# Patient Record
Sex: FEMALE | Race: BLACK OR AFRICAN AMERICAN | Employment: UNEMPLOYED | URBAN - METROPOLITAN AREA
[De-identification: names, ages, dates, MRNs, and addresses within clinical notes are randomized per-mention and may not be internally consistent; named-entity substitution may affect disease eponyms.]

---

## 2021-08-09 ENCOUNTER — APPOINTMENT (OUTPATIENT)
Dept: CT IMAGING | Age: 36
End: 2021-08-09
Payer: COMMERCIAL

## 2021-08-09 ENCOUNTER — HOSPITAL ENCOUNTER (EMERGENCY)
Age: 36
Discharge: HOME | End: 2021-08-09
Attending: EMERGENCY MEDICINE
Payer: COMMERCIAL

## 2021-08-09 ENCOUNTER — APPOINTMENT (OUTPATIENT)
Dept: GENERAL RADIOLOGY | Age: 36
End: 2021-08-09
Payer: COMMERCIAL

## 2021-08-09 VITALS
OXYGEN SATURATION: 99 % | HEART RATE: 58 BPM | DIASTOLIC BLOOD PRESSURE: 98 MMHG | TEMPERATURE: 97.3 F | HEIGHT: 68 IN | SYSTOLIC BLOOD PRESSURE: 158 MMHG | RESPIRATION RATE: 16 BRPM

## 2021-08-09 DIAGNOSIS — I10 HYPERTENSION, UNSPECIFIED TYPE: Primary | ICD-10-CM

## 2021-08-09 DIAGNOSIS — R06.00 DYSPNEA, UNSPECIFIED TYPE: ICD-10-CM

## 2021-08-09 DIAGNOSIS — R31.9 HEMATURIA, UNSPECIFIED TYPE: ICD-10-CM

## 2021-08-09 DIAGNOSIS — R20.2 TINGLING OF LEFT UPPER EXTREMITY: ICD-10-CM

## 2021-08-09 LAB
ALBUMIN SERPL-MCNC: 4 G/DL (ref 3.5–5.2)
ALP BLD-CCNC: 107 U/L (ref 35–104)
ALT SERPL-CCNC: 35 U/L (ref 0–32)
ANION GAP SERPL CALCULATED.3IONS-SCNC: 5 MMOL/L (ref 7–16)
AST SERPL-CCNC: 25 U/L (ref 0–31)
BACTERIA: ABNORMAL /HPF
BASOPHILS ABSOLUTE: 0 E9/L (ref 0–0.2)
BASOPHILS RELATIVE PERCENT: 0 % (ref 0–2)
BILIRUB SERPL-MCNC: 0.4 MG/DL (ref 0–1.2)
BILIRUBIN URINE: NEGATIVE
BLOOD, URINE: ABNORMAL
BUN BLDV-MCNC: 13 MG/DL (ref 6–20)
CALCIUM SERPL-MCNC: 9.4 MG/DL (ref 8.6–10.2)
CHLORIDE BLD-SCNC: 103 MMOL/L (ref 98–107)
CLARITY: CLEAR
CO2: 28 MMOL/L (ref 22–29)
COLOR: YELLOW
CREAT SERPL-MCNC: 0.6 MG/DL (ref 0.5–1)
D DIMER: 348 NG/ML DDU
EKG ATRIAL RATE: 56 BPM
EKG P AXIS: 42 DEGREES
EKG P-R INTERVAL: 150 MS
EKG Q-T INTERVAL: 450 MS
EKG QRS DURATION: 90 MS
EKG QTC CALCULATION (BAZETT): 434 MS
EKG R AXIS: 15 DEGREES
EKG T AXIS: 32 DEGREES
EKG VENTRICULAR RATE: 56 BPM
EOSINOPHILS ABSOLUTE: 0.42 E9/L (ref 0.05–0.5)
EOSINOPHILS RELATIVE PERCENT: 7.8 % (ref 0–6)
GFR AFRICAN AMERICAN: >60
GFR NON-AFRICAN AMERICAN: >60 ML/MIN/1.73
GLUCOSE BLD-MCNC: 104 MG/DL (ref 74–99)
GLUCOSE URINE: NEGATIVE MG/DL
HCG, URINE, POC: NEGATIVE
HCT VFR BLD CALC: 37.2 % (ref 34–48)
HEMOGLOBIN: 11.7 G/DL (ref 11.5–15.5)
KETONES, URINE: NEGATIVE MG/DL
LEUKOCYTE ESTERASE, URINE: NEGATIVE
LYMPHOCYTES ABSOLUTE: 2.32 E9/L (ref 1.5–4)
LYMPHOCYTES RELATIVE PERCENT: 42.6 % (ref 20–42)
Lab: NORMAL
MCH RBC QN AUTO: 19.9 PG (ref 26–35)
MCHC RBC AUTO-ENTMCNC: 31.5 % (ref 32–34.5)
MCV RBC AUTO: 63.4 FL (ref 80–99.9)
MONOCYTES ABSOLUTE: 0.22 E9/L (ref 0.1–0.95)
MONOCYTES RELATIVE PERCENT: 3.5 % (ref 2–12)
NEGATIVE QC PASS/FAIL: NORMAL
NEUTROPHILS ABSOLUTE: 2.48 E9/L (ref 1.8–7.3)
NEUTROPHILS RELATIVE PERCENT: 46.1 % (ref 43–80)
NITRITE, URINE: NEGATIVE
NUCLEATED RED BLOOD CELLS: 0 /100 WBC
PDW BLD-RTO: 15.1 FL (ref 11.5–15)
PH UA: 6 (ref 5–9)
PLATELET # BLD: 304 E9/L (ref 130–450)
PMV BLD AUTO: 9.9 FL (ref 7–12)
POIKILOCYTES: ABNORMAL
POLYCHROMASIA: ABNORMAL
POSITIVE QC PASS/FAIL: NORMAL
POTASSIUM REFLEX MAGNESIUM: 4.2 MMOL/L (ref 3.5–5)
PROTEIN UA: NEGATIVE MG/DL
RBC # BLD: 5.87 E12/L (ref 3.5–5.5)
RBC UA: ABNORMAL /HPF (ref 0–2)
SODIUM BLD-SCNC: 136 MMOL/L (ref 132–146)
SPECIFIC GRAVITY UA: 1.01 (ref 1–1.03)
TARGET CELLS: ABNORMAL
TOTAL PROTEIN: 7.4 G/DL (ref 6.4–8.3)
TROPONIN, HIGH SENSITIVITY: <6 NG/L (ref 0–9)
UROBILINOGEN, URINE: 0.2 E.U./DL
WBC # BLD: 5.4 E9/L (ref 4.5–11.5)
WBC UA: ABNORMAL /HPF (ref 0–5)

## 2021-08-09 PROCEDURE — 71046 X-RAY EXAM CHEST 2 VIEWS: CPT

## 2021-08-09 PROCEDURE — 80053 COMPREHEN METABOLIC PANEL: CPT

## 2021-08-09 PROCEDURE — 2580000003 HC RX 258: Performed by: RADIOLOGY

## 2021-08-09 PROCEDURE — 99284 EMERGENCY DEPT VISIT MOD MDM: CPT

## 2021-08-09 PROCEDURE — 93005 ELECTROCARDIOGRAM TRACING: CPT | Performed by: PHYSICIAN ASSISTANT

## 2021-08-09 PROCEDURE — 85025 COMPLETE CBC W/AUTO DIFF WBC: CPT

## 2021-08-09 PROCEDURE — 84484 ASSAY OF TROPONIN QUANT: CPT

## 2021-08-09 PROCEDURE — 6360000004 HC RX CONTRAST MEDICATION: Performed by: RADIOLOGY

## 2021-08-09 PROCEDURE — 70450 CT HEAD/BRAIN W/O DYE: CPT

## 2021-08-09 PROCEDURE — 81001 URINALYSIS AUTO W/SCOPE: CPT

## 2021-08-09 PROCEDURE — 71275 CT ANGIOGRAPHY CHEST: CPT

## 2021-08-09 PROCEDURE — 85378 FIBRIN DEGRADE SEMIQUANT: CPT

## 2021-08-09 PROCEDURE — 2580000003 HC RX 258: Performed by: PHYSICIAN ASSISTANT

## 2021-08-09 PROCEDURE — 72125 CT NECK SPINE W/O DYE: CPT

## 2021-08-09 PROCEDURE — 93010 ELECTROCARDIOGRAM REPORT: CPT | Performed by: INTERNAL MEDICINE

## 2021-08-09 RX ORDER — 0.9 % SODIUM CHLORIDE 0.9 %
1000 INTRAVENOUS SOLUTION INTRAVENOUS ONCE
Status: COMPLETED | OUTPATIENT
Start: 2021-08-09 | End: 2021-08-09

## 2021-08-09 RX ORDER — SODIUM CHLORIDE 0.9 % (FLUSH) 0.9 %
10 SYRINGE (ML) INJECTION PRN
Status: DISCONTINUED | OUTPATIENT
Start: 2021-08-09 | End: 2021-08-09 | Stop reason: HOSPADM

## 2021-08-09 RX ADMIN — SODIUM CHLORIDE 1000 ML: 9 INJECTION, SOLUTION INTRAVENOUS at 13:22

## 2021-08-09 RX ADMIN — IOPAMIDOL 75 ML: 755 INJECTION, SOLUTION INTRAVENOUS at 13:21

## 2021-08-09 RX ADMIN — Medication 10 ML: at 13:22

## 2021-08-09 NOTE — ED PROVIDER NOTES
ED Attending  [facility]  Department of Emergency Medicine   ED  Encounter Note  Admit Date/RoomTime: 2021 11:14 AM  ED Room: 32/    NAME: Jacinta Infante  : 1985  MRN: [id number]     Chief Complaint:  Hypertension (states is supposed to be taking medication but has been out for months, reports 178 SBP today) and Tingling (left arm since Saturday)    HISTORY OF PRESENT ILLNESS        Jacinta Infante is a 38 y.o. female who presents to the ED by private vehicle for high blood pressure, fatigue, and L arm tingling, beginning several days ago. Patient states that today she was at an appointment when they took her blood pressure and told her that it was in the 986S systolic. She states that she is supposed to be on blood pressure medication but has not taken it for several months as she ran out. She states that she was diagnosed with high blood pressure months ago and is unsure what the medication was that she is supposed to be taking. Patient notes for the past 2 weeks she has had intermittent shortness of breath whenever she goes up steps or walks for a long period of time. She does note that she is a smoker. Patient also states that she has left arm tingling for several days now. She states that it is to her entire arm. She denies any trauma to this arm. Patient notes a history of cocaine use but states she has not used for the past 8 months. She states that she does not have a family doctor. Patient does not believe that she has high cholesterol or is a diabetic. She denies a personal history of heart problems or family history of heart problems. Patient denies recent travel, surgery, calf pain or swelling, history of blood clots, history of CA, estrogen supplementation, or blood thinner use.  Pt denies fever, chills, HA, vision changes, speech changes, sore throat, neck pain, ear pain, dizziness, lightheadedness, chest pain, pain with inspiration, hemoptysis, cough, nausea, vomiting, diarrhea, constipation, abdominal pain, back pain, urinary symptoms, vaginal bleeding, calf pain/swelling, or extremity pain/injury. She denies any other significant PMH. PERC Rule for PE for Age <50:      Age ? 50 negative     HR ? 100 negative     O2 Sat on Room Air < 95% negative     Prior History of DVT/PE negative     Recent Trauma or Surgery negative     Hemoptysis negative     Exogenous Estrogen/Hormone Use negative     Unilateral Extgremity Swelling negative     * If ANY Criteria are positive, the PERC rule is not satisfied and cannot be used to rule out PE in this patient. HISTORY: 0  EC  AGE: 1  RISK FACTORS: 1  TROPONIN: 0    TOTAL SCORE: 1    ROS   Pertinent positives and negatives are stated within HPI, all other systems reviewed and are negative. Past Medical History:  has a past medical history of Hypertension. Surgical History:  has no past surgical history on file. Social History:      Family History: family history is not on file. Allergies: Patient has no known allergies. PHYSICAL EXAM   Oxygen Saturation Interpretation: Normal on room air analysis. ED Triage Vitals [21 1112]   BP Temp Temp Source Pulse Resp SpO2 Height Weight   (!) 139/99 97.3 °F (36.3 °C) Temporal 58 16 99 % 5' 7.5\" (1.715 m) --       Vitals:    21 1112 21 1403   BP: (!) 139/99 (!) 158/98   Pulse: 58    Resp: 16    Temp: 97.3 °F (36.3 °C)    TempSrc: Temporal    SpO2: 99%    Height: 5' 7.5\" (1.715 m)      Physical Exam  Constitutional/General: Alert and oriented x3, well appearing, non toxic. HEENT:  NC/NT. PERRLA, EOMI bilaterally; Airway patent. Neck: Supple, full ROM, non tender to palpation in the midline, no stridor, no crepitus, no meningeal signs  Respiratory: Lungs clear to auscultation bilaterally, no wheezes, rales, stridor, or rhonchi. Not in respiratory distress  CV:  Regular rate. Regular rhythm. No murmurs, gallops, or rubs.  2+ distal Glucose 104 (H) 74 - 99 mg/dL    BUN 13 6 - 20 mg/dL    CREATININE 0.6 0.5 - 1.0 mg/dL    GFR Non-African American >60 >=60 mL/min/1.73    GFR African American >60     Calcium 9.4 8.6 - 10.2 mg/dL    Total Protein 7.4 6.4 - 8.3 g/dL    Albumin 4.0 3.5 - 5.2 g/dL    Total Bilirubin 0.4 0.0 - 1.2 mg/dL    Alkaline Phosphatase 107 (H) 35 - 104 U/L    ALT 35 (H) 0 - 32 U/L    AST 25 0 - 31 U/L   Troponin   Result Value Ref Range    Troponin, High Sensitivity <6 0 - 9 ng/L   Urinalysis with Microscopic   Result Value Ref Range    Color, UA Yellow Straw/Yellow    Clarity, UA Clear Clear    Glucose, Ur Negative Negative mg/dL    Bilirubin Urine Negative Negative    Ketones, Urine Negative Negative mg/dL    Specific Gravity, UA 1.015 1.005 - 1.030    Blood, Urine LARGE (A) Negative    pH, UA 6.0 5.0 - 9.0    Protein, UA Negative Negative mg/dL    Urobilinogen, Urine 0.2 <2.0 E.U./dL    Nitrite, Urine Negative Negative    Leukocyte Esterase, Urine Negative Negative    WBC, UA NONE 0 - 5 /HPF    RBC, UA 5-10 (A) 0 - 2 /HPF    Bacteria, UA NONE SEEN None Seen /HPF   D-Dimer, Quantitative   Result Value Ref Range    D-Dimer, Quant 348 ng/mL DDU   POC Pregnancy Urine Qual   Result Value Ref Range    HCG, Urine, POC Negative Negative    Lot Number [id number]     Positive QC Pass/Fail Pass     Negative QC Pass/Fail Pass    EKG 12 Lead   Result Value Ref Range    Ventricular Rate 56 BPM    Atrial Rate 56 BPM    P-R Interval 150 ms    QRS Duration 90 ms    Q-T Interval 450 ms    QTc Calculation (Bazett) 434 ms    P Axis 42 degrees    R Axis 15 degrees    T Axis 32 degrees     Imaging: All Radiology results interpreted by Radiologist unless otherwise noted. CTA PULMONARY W CONTRAST   Final Result   No evidence of pulmonary embolism or acute pulmonary abnormality. XR CHEST (2 VW)   Final Result   No acute process. CT HEAD WO CONTRAST   Final Result   No acute intracranial abnormality.   Specifically, there is no acute the diagnosis and prognosis. Questions are answered at this time and are agreeable with the plan. ASSESSMENT     1. Hypertension, unspecified type    2. Hematuria, unspecified type    3. Tingling of left upper extremity    4. Dyspnea, unspecified type      This patient's ED course included: a personal history and physicial examination, re-evaluation prior to disposition and multiple bedside re-evaluations  This patient has remained hemodynamically stable and improved during their ED course. PLAN   Discharged home. Patient condition is good. New Medications     There are no discharge medications for this patient. Electronically signed by Sae Hyde PA-C   DD: 8/9/21  **This report was transcribed using voice recognition software. Every effort was made to ensure accuracy; however, inadvertent computerized transcription errors may be present.   END OF PROVIDER NOTE        Lazaro Henriquez PA-C  08/09/21 5579

## 2022-02-10 ENCOUNTER — HOSPITAL ENCOUNTER (EMERGENCY)
Age: 37
Discharge: HOME | End: 2022-02-10
Payer: COMMERCIAL

## 2022-02-10 ENCOUNTER — APPOINTMENT (OUTPATIENT)
Dept: GENERAL RADIOLOGY | Age: 37
End: 2022-02-10
Payer: COMMERCIAL

## 2022-02-10 VITALS
OXYGEN SATURATION: 100 % | RESPIRATION RATE: 16 BRPM | HEART RATE: 62 BPM | DIASTOLIC BLOOD PRESSURE: 69 MMHG | SYSTOLIC BLOOD PRESSURE: 117 MMHG | TEMPERATURE: 98.2 F

## 2022-02-10 DIAGNOSIS — S82.831A CLOSED FRACTURE OF DISTAL END OF RIGHT FIBULA, UNSPECIFIED FRACTURE MORPHOLOGY, INITIAL ENCOUNTER: Primary | ICD-10-CM

## 2022-02-10 DIAGNOSIS — S92.101A CLOSED NONDISPLACED FRACTURE OF RIGHT TALUS, UNSPECIFIED PORTION OF TALUS, INITIAL ENCOUNTER: ICD-10-CM
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
## 2022-02-10 PROCEDURE — 99285 EMERGENCY DEPT VISIT HI MDM: CPT

## 2022-02-10 PROCEDURE — 6360000002 HC RX W HCPCS: Performed by: FAMILY MEDICINE

## 2022-02-10 PROCEDURE — 29515 APPLICATION SHORT LEG SPLINT: CPT

## 2022-02-10 PROCEDURE — 6360000002 HC RX W HCPCS: Performed by: PHYSICIAN ASSISTANT

## 2022-02-10 PROCEDURE — 73610 X-RAY EXAM OF ANKLE: CPT

## 2022-02-10 PROCEDURE — 96372 THER/PROPH/DIAG INJ SC/IM: CPT

## 2022-02-10 PROCEDURE — 73630 X-RAY EXAM OF FOOT: CPT

## 2022-02-10 RX ORDER — OXYCODONE HYDROCHLORIDE AND ACETAMINOPHEN 5; 325 MG/1; MG/1
1 TABLET ORAL EVERY 8 HOURS PRN
Qty: 12 TABLET | Refills: 0 | Status: SHIPPED | OUTPATIENT
Start: 2022-02-10 | End: 2022-02-14

## 2022-02-10 RX ORDER — FENTANYL CITRATE 50 UG/ML
50 INJECTION, SOLUTION INTRAMUSCULAR; INTRAVENOUS ONCE
Status: COMPLETED | OUTPATIENT
Start: 2022-02-10 | End: 2022-02-10

## 2022-02-10 RX ORDER — KETOROLAC TROMETHAMINE 30 MG/ML
15 INJECTION, SOLUTION INTRAMUSCULAR; INTRAVENOUS ONCE
Status: COMPLETED | OUTPATIENT
Start: 2022-02-10 | End: 2022-02-10

## 2022-02-10 RX ORDER — IBUPROFEN 800 MG/1
800 TABLET ORAL 2 TIMES DAILY PRN
Qty: 20 TABLET | Refills: 0 | Status: SHIPPED | OUTPATIENT
Start: 2022-02-10 | End: 2022-02-21

## 2022-02-10 RX ORDER — FENTANYL CITRATE 50 UG/ML
100 INJECTION, SOLUTION INTRAMUSCULAR; INTRAVENOUS ONCE
Status: DISCONTINUED | OUTPATIENT
Start: 2022-02-10 | End: 2022-02-10

## 2022-02-10 RX ADMIN — FENTANYL CITRATE 50 MCG: 0.05 INJECTION, SOLUTION INTRAMUSCULAR; INTRAVENOUS at 12:30

## 2022-02-10 RX ADMIN — KETOROLAC TROMETHAMINE 15 MG: 30 INJECTION, SOLUTION INTRAMUSCULAR at 11:32

## 2022-02-10 ASSESSMENT — PAIN SCALES - GENERAL
PAINLEVEL_OUTOF10: 3
PAINLEVEL_OUTOF10: 10
PAINLEVEL_OUTOF10: 3

## 2022-02-10 ASSESSMENT — PAIN DESCRIPTION - PAIN TYPE
TYPE: ACUTE PAIN
TYPE: ACUTE PAIN

## 2022-02-10 ASSESSMENT — PAIN DESCRIPTION - PROGRESSION: CLINICAL_PROGRESSION: RAPIDLY IMPROVING

## 2022-02-10 ASSESSMENT — PAIN DESCRIPTION - ORIENTATION: ORIENTATION: RIGHT

## 2022-02-10 ASSESSMENT — PAIN DESCRIPTION - LOCATION
LOCATION: ANKLE
LOCATION: FOOT

## 2022-02-10 ASSESSMENT — PAIN - FUNCTIONAL ASSESSMENT: PAIN_FUNCTIONAL_ASSESSMENT: 0-10

## 2022-02-10 ASSESSMENT — PAIN DESCRIPTION - FREQUENCY: FREQUENCY: CONTINUOUS

## 2022-02-10 NOTE — ED NOTES
Discussed discharge plans with patient including importance of adhering to the instructions the Ortho MD discussed with her and the importance of following medication regiment and making follow up appnt with the outpatient Ortho. Pt instructed and coached on proper use and mechanics of utilizing crutches and was able to demonstrate proper use of the crutches at bedside. Pt father also at bedside to ask questions and ensure he understood discharge process.   Pt remained alert and oriented x4 and pain was minimal.      Catarino Ford RN  02/10/22 2022

## 2022-02-10 NOTE — DISCHARGE INSTR - COC
Continuity of Care Form    Patient Name: Ines Rueda   :  1985  MRN:  [id number]    Admit date:  2/10/2022  Discharge date:  ***    Code Status Order: No Order   Advance Directives:      Admitting Physician:  No admitting provider for patient encounter. PCP: No primary care provider on file. Discharging Nurse: [facility] Unit/Room#: ST04/ST-04  Discharging Unit Phone Number: ***    Emergency Contact:   Extended Emergency Contact Information  Primary Emergency Contact: Todd Rivas  Mobile Phone: [phone]  Relation: Parent   needed? No    Past Surgical History:  History reviewed. No pertinent surgical history. Immunization History:   Immunization History   Administered Date(s) Administered    COVID-19, J&J, PF, 0.5 mL 2021       Active Problems: There is no problem list on file for this patient. Isolation/Infection:   Isolation            No Isolation          Patient Infection Status       None to display            Nurse Assessment:  Last Vital Signs: There were no vitals taken for this visit. Last documented pain score (0-10 scale): Pain Level: 10  Last Weight:   Wt Readings from Last 1 Encounters:   No data found for Wt     Mental Status:  {IP PT MENTAL STATUS:40153}    IV Access:  { LIO IV ACCESS:527374067}    Nursing Mobility/ADLs:  Walking   {CHP DME DAVC:829482732}  Transfer  {CHP DME WMRS:517365775}  Bathing  {CHP DME SFWF:500526139}  Dressing  {CHP DME VRXT:547174055}  Toileting  {P DME GJSK:741458027}  Feeding  {P DME IOWD:149492270}  Med Admin  {P DME THNU:423711005}  Med Delivery   { LIO MED Delivery:513717336}    Wound Care Documentation and Therapy:        Elimination:  Continence:    Bowel: {YES / QP:91734}  Bladder: {YES / LS:26122}  Urinary Catheter: {Urinary Catheter:939731648}   Colostomy/Ileostomy/Ileal Conduit: {YES / CS:91264}       Date of Last BM: ***  No intake or output data in the 24 hours ending 02/10/22 1134  No intake/output data recorded.     Safety Concerns:     [address] Mario LIO Safety Concerns:945143366}    Impairments/Disabilities:      [address] [facility] Impairments/Disabilities:291615061}    Nutrition Therapy:  Current Nutrition Therapy:   Dave Mario [facility] Diet List:759536306}    Routes of Feeding: {CHP DME Other Feedings:592151583}  Liquids: {Slp liquid thickness:94591}  Daily Fluid Restriction: {CHP DME Yes amt example:542449690}  Last Modified Barium Swallow with Video (Video Swallowing Test): {Done Not Done YNCR:678362652}    Treatments at the Time of Hospital Discharge:   Respiratory Treatments: ***  Oxygen Therapy:  {Therapy; copd oxygen:49363}  Ventilator:    { CC Vent DNCN:350276033}    Rehab Therapies: {THERAPEUTIC INTERVENTION:5660798015}  Weight Bearing Status/Restrictions: Tawanda Mario  Weight Bearin}  Other Medical Equipment (for information only, NOT a DME order):  {EQUIPMENT:643193733}  Other Treatments: ***    Patient's personal belongings (please select all that are sent with patient):  {CHP DME Belongings:632793617}    RN SIGNATURE:  {Esignature:571269269}    CASE MANAGEMENT/SOCIAL WORK SECTION    Inpatient Status Date: ***    Readmission Risk Assessment Score:  Readmission Risk              Risk of Unplanned Readmission:  0           Discharging to Facility/ Agency   Name:   Address:  Phone:  Fax:    Dialysis Facility (if applicable)   Name:  Address:  Dialysis Schedule:  Phone:  Fax:    / signature: {Esignature:870379394}    PHYSICIAN SECTION    Prognosis: {Prognosis:3516579107}    Condition at Discharge: Dave Mario Patient Condition:534843164}    Rehab Potential (if transferring to Rehab): {Prognosis:6587429983}    Recommended Labs or Other Treatments After Discharge: ***    Physician Certification: I certify the above information and transfer of Ines Rueda  is necessary for the continuing treatment of the diagnosis listed and that she requires {Admit to Appropriate Level of Care:91490} for {GREATER/LESS:975202572} 30 days.      Update Admission H&P: {CHP DME Changes in RGPKK:383803327}    PHYSICIAN SIGNATURE:  {Esignature:231283894}

## 2022-02-10 NOTE — ED PROVIDER NOTES
Supple. Right Ankle: Lateral malleolus              Tenderness:  severe. Swelling: Moderate. Deformity: no deformity observed/palpated. ROM: unable to test due to pain. Skin:  tenderness and swelling. Neurovascular: Motor deficit: none. Sensory deficit:   none. Pulse deficit: none. Capillary refill: normal.  Right Knee:              Tenderness:  none. Swelling: None. Deformity: no deformity observed/palpated. ROM: full range of motion. Skin:  no wounds, erythema, or swelling. Right Foot: diffusely across entire foot              Tenderness:  mild. Swelling: Mild. Deformity: no deformity observed/palpated. ROM: full range of motion. Skin:  no wounds, erythema, or swelling  Gait:  ataxic. Lymphatics: No lymphangitis or adenopathy noted. Neurological:  Oriented. Motor functions intact. Lab / Imaging Results   (All laboratory and radiology results have been personally reviewed by myself)  Labs:  No results found for this visit on 02/10/22. Imaging: All Radiology results interpreted by Radiologist unless otherwise noted. XR ANKLE RIGHT (MIN 3 VIEWS)   Final Result   Relatively nondisplaced spiral fracture of the distal fibula extending to the   level of the tibiotalar joint line. XR FOOT RIGHT (MIN 3 VIEWS)   Final Result   Fracture of the distal fibula with no acute bony abnormality about the right   foot.          XR ANKLE RIGHT (MIN 3 VIEWS)    (Results Pending)     ED Course / Medical Decision Making     Medications   ketorolac (TORADOL) injection 15 mg (15 mg IntraMUSCular Given 2/10/22 1132)   fentaNYL (SUBLIMAZE) injection 50 mcg (50 mcg IntraMUSCular Given 2/10/22 1230)        Consults:   IP CONSULT TO ORTHOPEDIC SURGERY    Procedure(s):  PROCEDURE NOTE  2/10/22       Time: [facility] APPLICATION  Risks, benefits and alternatives (for applicable procedures below) described. Performed By: Orthopedic resident    Indication:  fracture of distal fibula . Procedure:   A short  right leg  Posterior and stirrup splint was applied by me. The patient tolerated the procedure well. MDM:     Patient is a 48-year-old female that is presenting after a slip and fall going down her driveway. Patient denies any head injury or loss of consciousness. Patient states she cannot bear any weight on her right ankle. Patient was taken in by ambulance. Patient does have lateral malleolus swelling. No obvious deformity noted. Patient is good distal pulses. Good cap refill. Imaging was obtained based on high suspicion for fracture / bony abnormality, dislocation, retained foreign body, joint effusion as per history/physical findings. Patient was found to have a spiral distal fibular fracture with talus involvement. This was not seen on lateral x-ray but after realignment by orthopedic resident talus injury noted. Patient was splinted by orthopedic resident. Patient was informed that she may need outpatient surgery. Patient will use rest, ice elevation alternate Tylenol ibuprofen every 6 hours with food. Patient will be given pain medication for the next 5 days. Patient was explained of worsening signs and symptoms and when to return back to the emergency department. Patient was told to follow-up outpatient with orthopedics and call for Zeeshan Toure tomorrow morning to get her appointment as soon as possible. Crutches will be given. Patient was explained signs and symptoms of worsening pain, swelling, evidence of compartment syndrome or to return back to the emergency department. Patient voiced understanding and agree with plan management. Plan is subsequently for symptom control, limited use and immobilization with outpatient follow-up with pcp as instructed in d/c instructions and with on-call orthopaedist as instructed in d/c instructions. Patient was explicitly instructed on specific signs and symptoms on which to return to the emergency room for. Patient was instructed to return to the ER for any new or worsening symptoms. Additional discharge instructions were given verbally. All questions were answered. Patient is comfortable and agreeable with discharge plan. Patient in no acute distress and non-toxic in appearance. Plan of Care/Counseling:  Patito Lainez PA-C reviewed today's visit with the patient in addition to providing specific details for the plan of care and counseling regarding the diagnosis and prognosis. Questions are answered at this time and are agreeable with the plan. Assessment      1. Closed fracture of distal end of right fibula, unspecified fracture morphology, initial encounter    2. Closed nondisplaced fracture of right talus, unspecified portion of talus, initial encounter    3. Fall due to slipping on ice or snow, initial encounter      Plan   Discharged home. Patient condition is stable    New Medications     New Prescriptions    IBUPROFEN (ADVIL;MOTRIN) 800 MG TABLET    Take 1 tablet by mouth 2 times daily as needed for Pain Please alternate with Tylenol 500 mg every 6-8 hours with food and use the pain medication for breakthrough pain only    OXYCODONE-ACETAMINOPHEN (PERCOCET) 5-325 MG PER TABLET    Take 1 tablet by mouth every 8 hours as needed for Pain for up to 4 days. Intended supply: 3 days. Take lowest dose possible to manage pain     Electronically signed by Patito Lainez PA-C   DD: 2/10/22  **This report was transcribed using voice recognition software. Every effort was made to ensure accuracy; however, inadvertent computerized transcription errors may be present.   END OF ED PROVIDER NOTE        Patito Lainez PA-C  02/10/22 0165

## 2022-02-10 NOTE — CONSULTS
Department of Orthopedic Surgery  Resident Consult Note          Reason for Consult: Right ankle pain, fall    HISTORY OF PRESENT ILLNESS:       Patient is a 39 y.o. female who presents with right ankle pain after a fall. Patient states she slipped on ice and twisted her ankle, fell to the ground and had immediate pain and inability to ambulate. Did not hit her head or lose consciousness. Denies numbness/tingling/paresthesias. Denies any other orthopedic complaints at this time. 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 No pertinent surgical history. Current Medications:   No current facility-administered medications for this encounter. Allergies:  Patient has no known allergies. Social History:   TOBACCO:   reports that she has been smoking cigarettes. She has never used smokeless tobacco.  ETOH:   has no history on file for alcohol use. DRUGS:   has no history on file for drug use. ACTIVITIES OF DAILY LIVING:    OCCUPATION:    Family History:   History reviewed. No pertinent family history. REVIEW OF SYSTEMS:  CONSTITUTIONAL:  negative for  fevers, chills  EYES:  negative for blurred vision, visual disturbance  HEENT:  negative for  hearing loss, voice change  RESPIRATORY:  negative for  dyspnea, wheezing  CARDIOVASCULAR:  negative for  chest pain, palpitations  GASTROINTESTINAL:  negative for nausea, vomiting  GENITOURINARY:  negative for frequency, urinary incontinence  HEMATOLOGIC/LYMPHATIC:  negative for bleeding and petechiae  MUSCULOSKELETAL:  positive for right ankle pain  NEUROLOGICAL:  negative for headaches, dizziness  BEHAVIOR/PSYCH:  negative for increased agitation and anxiety    PHYSICAL EXAM:    VITALS:  There were no vitals taken for this visit.   CONSTITUTIONAL:  awake, alert, cooperative, no apparent distress, and appears stated age  MUSCULOSKELETAL:  Right lower Extremity:  · Skin intact circumferentially  · Compartments of the leg are soft and compressible  · There is moderate edema over the lateral malleolus with + TTP laterally, -TTP medially  · No TTP about the knee  · Sensation light touch is grossly intact in the peroneal's, sural, saphenous, tibial distribution  · 2+ DP and PT pulses with good capillary refill, toes warm perfused  · Motor intact DF/PF/EHL, motor exam limited secondary to painful ROM    Secondary Exam:   · No other obvious signs of trauma or injury on secondary exam    DATA:    CBC:   Lab Results   Component Value Date    WBC 5.4 08/09/2021    RBC 5.87 08/09/2021    HGB 11.7 08/09/2021    HCT 37.2 08/09/2021    MCV 63.4 08/09/2021    MCH 19.9 08/09/2021    MCHC 31.5 08/09/2021    RDW 15.1 08/09/2021     08/09/2021    MPV 9.9 08/09/2021     PT/INR:  No results found for: PROTIME, INR    Radiology Review: Three views of the right ankle reviewed. Technically poor lateral view of the ankle with poor visualization of the posterior malleolus. There is a nondisplaced Trujillo B type fracture of the lateral malleolus. Medial clear space with borderline widening. Post splinting x-rays reviewed. Redemonstration of the nondisplaced Trujillo B lateral malleolus fracture. There is also a nondisplaced posterior malleolus fracture that does not involve the articular surface. Medial clear space does appear to be close down compared with films from before splinting. IMPRESSION:  · Right trimalleolar ankle fracture equivalent    PLAN:  · Patient was placed in a short leg three sided splint. 10 cc of 1% lidocaine without epinephrine was injected into the patient's ankle joint from an anterior approach. She was then placed in a well-padded three sided splint and a three-point mold was applied. Portable x-ray was used to monitor reduction/splinting.   Patient tolerated procedure well  · Pain control per ED  · Nonweightbearing to the right lower extremity  · Follow-up with Dr. Tomás Frey in 1 to 2 weeks for repeat x-rays  · Discussed with patient that she would likely need surgery to fix her right ankle fracture in the setting of a possible syndesmotic injury.

## 2022-02-14 ENCOUNTER — OFFICE VISIT (OUTPATIENT)
Dept: ORTHOPEDIC SURGERY | Age: 37
End: 2022-02-14
Payer: COMMERCIAL

## 2022-02-14 VITALS — WEIGHT: 293 LBS | BODY MASS INDEX: 44.41 KG/M2 | HEIGHT: 68 IN

## 2022-02-14 DIAGNOSIS — S82.891A CLOSED FRACTURE OF RIGHT ANKLE, INITIAL ENCOUNTER: Primary | ICD-10-CM

## 2022-02-14 PROCEDURE — G8417 CALC BMI ABV UP PARAM F/U: HCPCS | Performed by: ORTHOPAEDIC SURGERY

## 2022-02-14 PROCEDURE — G8427 DOCREV CUR MEDS BY ELIG CLIN: HCPCS | Performed by: ORTHOPAEDIC SURGERY

## 2022-02-14 PROCEDURE — 99204 OFFICE O/P NEW MOD 45 MIN: CPT | Performed by: ORTHOPAEDIC SURGERY

## 2022-02-14 PROCEDURE — G8484 FLU IMMUNIZE NO ADMIN: HCPCS | Performed by: ORTHOPAEDIC SURGERY

## 2022-02-14 PROCEDURE — 4004F PT TOBACCO SCREEN RCVD TLK: CPT | Performed by: ORTHOPAEDIC SURGERY

## 2022-02-14 RX ORDER — FLUOXETINE HYDROCHLORIDE 40 MG/1
40 CAPSULE ORAL NIGHTLY
COMMUNITY
Start: 2021-12-14 | End: 2022-02-21

## 2022-02-14 RX ORDER — DIPHENOXYLATE HYDROCHLORIDE AND ATROPINE SULFATE 2.5; .025 MG/1; MG/1
TABLET ORAL
COMMUNITY
Start: 2021-11-16 | End: 2022-02-21

## 2022-02-14 RX ORDER — DOXEPIN HYDROCHLORIDE 25 MG/1
CAPSULE ORAL
Status: ON HOLD | COMMUNITY
Start: 2021-12-14 | End: 2022-07-19 | Stop reason: HOSPADM

## 2022-02-14 RX ORDER — CARIPRAZINE 3 MG/1
CAPSULE, GELATIN COATED ORAL
COMMUNITY
Start: 2021-12-14 | End: 2022-02-21

## 2022-02-14 RX ORDER — QUETIAPINE FUMARATE 200 MG/1
400 TABLET, FILM COATED ORAL NIGHTLY
Status: ON HOLD | COMMUNITY
End: 2022-07-19 | Stop reason: HOSPADM

## 2022-02-14 RX ORDER — ERGOCALCIFEROL 1.25 MG/1
CAPSULE ORAL
COMMUNITY
Start: 2021-11-19 | End: 2022-02-21

## 2022-02-14 NOTE — PROGRESS NOTES
Department of Orthopedic Surgery  History and Physical      CHIEF COMPLAINT:  Right ankle pain    HISTORY OF PRESENT ILLNESS:                Patient is a 39 y.o. female who presents with right ankle pain after a fall. Patient states she slipped on ice and twisted her ankle, fell to the ground and had immediate pain and inability to ambulate. Her injury occurred on 2/10/2022. Did not hit her head or lose consciousness. Denies numbness/tingling/paresthesias. Denies any other orthopedic complaints at this time. Patient states she is student at [employer] and is new to the area. Of note she does have a history of opioid abuse and is currently in a sober house. Past Medical History:        Diagnosis Date    Hypertension      Past Surgical History:    History reviewed. No pertinent surgical history. Current Medications:   No current facility-administered medications for this visit. Allergies:  Patient has no known allergies. Social History:   TOBACCO:   reports that she has been smoking cigarettes. She has never used smokeless tobacco.  ETOH:   has no history on file for alcohol use. DRUGS:   has no history on file for drug use. ACTIVITIES OF DAILY LIVING:    OCCUPATION:    Family History:   History reviewed. No pertinent family history.     REVIEW OF SYSTEMS:  CONSTITUTIONAL:  negative  EYES:  negative  HEENT:  negative  RESPIRATORY:  negative  CARDIOVASCULAR:  negative  GASTROINTESTINAL:  negative  MUSCULOSKELETAL:  Right ankle pain  NEUROLOGICAL:  negative    PHYSICAL EXAM:    VITALS:  Ht 5' 7.5\" (1.715 m)   Wt 296 lb (134.3 kg)   BMI 45.68 kg/m²   CONSTITUTIONAL:  awake, alert, cooperative, no apparent distress, and appears stated age  EYES:  Lids and lashes normal, pupils equal, round and reactive to light, extra ocular muscles intact, sclera clear, conjunctiva normal  ENT:  Normocephalic, without obvious abnormality, atraumatic, sinuses nontender on palpation, external ears without lesions, oral pharynx surgical intervention. Discussed with patient with her history of opioid abuse we would want to limit her post operative opioid use to a minimum. She states all medications are controlled at the sober house. Patient understands and would like to proceed. I have seen and evaluated the patient and agree with the above assessment and plan on today's visit. I have performed the key components of the history and physical examination with significant findings of right ankle fracture. Plan for ORIF. I concur with the findings and plan as documented. I explained the risks, benefits, alternatives and complications of surgery with the patient including but not limited to the risks of death, possible damage to nerves, vessels, or tendons, possible infection, possible nonunion, possible malunion, possible hardware failure, possible need for hardware removal, stiffness, as well as the possible need further surgery and unanticipated complications. The patient voiced understanding and all questions were answered. The patient elected to proceed with surgical intervention. Informed consent was obtained for continued opioid treatment. Patient agrees to continue the current treatment and agrees the benefits of opioid treatment ( decreased pain, improved function) continue to outweigh the potential risks ( confusion, change in thinking ability, depression, dry mouth, nausea, constipation, vomiting, impaired coordination/balance, sleepiness, damage liver or kidneys, opioid-induced hyperalgesia, physical dependence, addiction, withdrawal, respiratory depression and even death).

## 2022-02-18 ENCOUNTER — PREP FOR PROCEDURE (OUTPATIENT)
Dept: ORTHOPEDIC SURGERY | Age: 37
End: 2022-02-18

## 2022-02-18 RX ORDER — SODIUM CHLORIDE 9 MG/ML
25 INJECTION, SOLUTION INTRAVENOUS PRN
Status: CANCELLED | OUTPATIENT
Start: 2022-02-18

## 2022-02-18 RX ORDER — SODIUM CHLORIDE 9 MG/ML
INJECTION, SOLUTION INTRAVENOUS CONTINUOUS
Status: CANCELLED | OUTPATIENT
Start: 2022-02-18

## 2022-02-18 RX ORDER — SODIUM CHLORIDE 0.9 % (FLUSH) 0.9 %
5-40 SYRINGE (ML) INJECTION EVERY 12 HOURS SCHEDULED
Status: CANCELLED | OUTPATIENT
Start: 2022-02-18

## 2022-02-18 RX ORDER — SODIUM CHLORIDE 0.9 % (FLUSH) 0.9 %
5-40 SYRINGE (ML) INJECTION PRN
Status: CANCELLED | OUTPATIENT
Start: 2022-02-18

## 2022-02-21 RX ORDER — PRAZOSIN HYDROCHLORIDE 1 MG/1
1 CAPSULE ORAL NIGHTLY
Status: ON HOLD | COMMUNITY
End: 2022-07-19 | Stop reason: HOSPADM

## 2022-02-21 NOTE — PROGRESS NOTES
Have you been tested for COVID  No           Have you been told you were positive for COVID No  Have you had any known exposure to someone that is positive for COVID No  Do you have a cough                   No              Do you have shortness of breath No                 Do you have a sore throat            No                Are you having chills                    No                Are you having muscle aches. No                    Please come to the hospital wearing a mask and have your significant other wear a mask as well. Both of you should check your temperature before leaving to come here,  if it is 100 or higher please call [phone] for instruction. [facility] PRE-ADMISSION TESTING INSTRUCTIONS      ARRIVAL INSTRUCTIONS:  [x] Parking the day of Surgery is located in the Main Entrance lot. Upon entering the main door make an immediate right to the surgery reception desk.     [x] Bring photo ID and insurance card    [x] Please be sure to arrange for responsible adult to provide transportation to and from the hospital    [x] Please arrange for responsible adult to be with you for the 24 hour period post procedure due to having anesthesia      GENERAL INSTRUCTIONS:    [x] Nothing by mouth after midnight, including gum, candy, mints or water    [x] You may brush your teeth, but do not swallow any water    [x] Shower or bath with soap, lather and rinse well, AM of Surgery, no lotion, powders or creams to surgical site    [x] No tobacco products within 24 hours of surgery     [x] Jewelry, body piercing's, eyeglasses, contact lenses and dentures are not permitted into surgery (bring cases)      [x] Please do not wear any nail polish, make up or hair products on the day of surgery    [x] You can expect a call the business day prior to procedure to notify you if your arrival time changes    [x] If you receive a survey after surgery we would greatly appreciate your

## 2022-02-21 NOTE — PROGRESS NOTES
Pt states took shower getting current dressing/ACE wet yesterday.   Instructed to contact surgeons office for directive

## 2022-02-23 ENCOUNTER — ANESTHESIA EVENT (OUTPATIENT)
Dept: OPERATING ROOM | Age: 37
End: 2022-02-23
Payer: COMMERCIAL

## 2022-02-23 ENCOUNTER — HOSPITAL ENCOUNTER (OUTPATIENT)
Dept: GENERAL RADIOLOGY | Age: 37
Setting detail: OUTPATIENT SURGERY
Discharge: HOME | End: 2022-02-25
Attending: ORTHOPAEDIC SURGERY
Payer: COMMERCIAL

## 2022-02-23 ENCOUNTER — HOSPITAL ENCOUNTER (OUTPATIENT)
Age: 37
Setting detail: OUTPATIENT SURGERY
Discharge: HOME | End: 2022-02-23
Attending: ORTHOPAEDIC SURGERY | Admitting: ORTHOPAEDIC SURGERY
Payer: COMMERCIAL

## 2022-02-23 ENCOUNTER — ANESTHESIA (OUTPATIENT)
Dept: OPERATING ROOM | Age: 37
End: 2022-02-23
Payer: COMMERCIAL

## 2022-02-23 VITALS
HEART RATE: 88 BPM | BODY MASS INDEX: 44.41 KG/M2 | WEIGHT: 293 LBS | OXYGEN SATURATION: 98 % | SYSTOLIC BLOOD PRESSURE: 150 MMHG | RESPIRATION RATE: 16 BRPM | HEIGHT: 68 IN | DIASTOLIC BLOOD PRESSURE: 85 MMHG | TEMPERATURE: 97.8 F

## 2022-02-23 VITALS
OXYGEN SATURATION: 96 % | DIASTOLIC BLOOD PRESSURE: 84 MMHG | RESPIRATION RATE: 15 BRPM | SYSTOLIC BLOOD PRESSURE: 158 MMHG | TEMPERATURE: 96.4 F

## 2022-02-23 DIAGNOSIS — G89.18 POST-OPERATIVE PAIN: Primary | ICD-10-CM

## 2022-02-23 DIAGNOSIS — R52 PAIN: ICD-10-CM

## 2022-02-23 LAB
HCG, URINE, POC: NEGATIVE
Lab: NORMAL
NEGATIVE QC PASS/FAIL: NORMAL
POSITIVE QC PASS/FAIL: NORMAL

## 2022-02-23 PROCEDURE — 7100000011 HC PHASE II RECOVERY - ADDTL 15 MIN: Performed by: ORTHOPAEDIC SURGERY

## 2022-02-23 PROCEDURE — 7100000010 HC PHASE II RECOVERY - FIRST 15 MIN: Performed by: ORTHOPAEDIC SURGERY

## 2022-02-23 PROCEDURE — 7100000000 HC PACU RECOVERY - FIRST 15 MIN: Performed by: ORTHOPAEDIC SURGERY

## 2022-02-23 PROCEDURE — 6360000002 HC RX W HCPCS: Performed by: PHYSICIAN ASSISTANT

## 2022-02-23 PROCEDURE — 3600000014 HC SURGERY LEVEL 4 ADDTL 15MIN: Performed by: ORTHOPAEDIC SURGERY

## 2022-02-23 PROCEDURE — 2580000003 HC RX 258: Performed by: PHYSICIAN ASSISTANT

## 2022-02-23 PROCEDURE — 7100000001 HC PACU RECOVERY - ADDTL 15 MIN: Performed by: ORTHOPAEDIC SURGERY

## 2022-02-23 PROCEDURE — 27829 TREAT LOWER LEG JOINT: CPT | Performed by: ORTHOPAEDIC SURGERY

## 2022-02-23 PROCEDURE — 6360000002 HC RX W HCPCS: Performed by: ANESTHESIOLOGY

## 2022-02-23 PROCEDURE — 2500000003 HC RX 250 WO HCPCS: Performed by: NURSE ANESTHETIST, CERTIFIED REGISTERED

## 2022-02-23 PROCEDURE — 3700000001 HC ADD 15 MINUTES (ANESTHESIA): Performed by: ORTHOPAEDIC SURGERY

## 2022-02-23 PROCEDURE — 27784 TREATMENT OF FIBULA FRACTURE: CPT | Performed by: ORTHOPAEDIC SURGERY

## 2022-02-23 PROCEDURE — 2709999900 HC NON-CHARGEABLE SUPPLY: Performed by: ORTHOPAEDIC SURGERY

## 2022-02-23 PROCEDURE — 6360000002 HC RX W HCPCS: Performed by: NURSE ANESTHETIST, CERTIFIED REGISTERED

## 2022-02-23 PROCEDURE — C1713 ANCHOR/SCREW BN/BN,TIS/BN: HCPCS | Performed by: ORTHOPAEDIC SURGERY

## 2022-02-23 PROCEDURE — 6370000000 HC RX 637 (ALT 250 FOR IP): Performed by: ANESTHESIOLOGY

## 2022-02-23 PROCEDURE — 6360000002 HC RX W HCPCS

## 2022-02-23 PROCEDURE — 3600000004 HC SURGERY LEVEL 4 BASE: Performed by: ORTHOPAEDIC SURGERY

## 2022-02-23 PROCEDURE — 3209999900 FLUORO FOR SURGICAL PROCEDURES

## 2022-02-23 PROCEDURE — 2720000010 HC SURG SUPPLY STERILE: Performed by: ORTHOPAEDIC SURGERY

## 2022-02-23 PROCEDURE — 3700000000 HC ANESTHESIA ATTENDED CARE: Performed by: ORTHOPAEDIC SURGERY

## 2022-02-23 DEVICE — SCREW BNE L14MM DIA3.5MM CORT TI ST NONLOCKING HD LO PROF: Type: IMPLANTABLE DEVICE | Site: ANKLE | Status: FUNCTIONAL

## 2022-02-23 DEVICE — SYSTEM IMPL TI UHMWPE KNOTLESS FOR SYNDESMOSIS FIX: Type: IMPLANTABLE DEVICE | Site: ANKLE | Status: FUNCTIONAL

## 2022-02-23 DEVICE — PLATE BONE 7 H TI LCK 3RD TBLR: Type: IMPLANTABLE DEVICE | Site: ANKLE | Status: FUNCTIONAL

## 2022-02-23 DEVICE — SCREW BNE L12MM DIA3.5MM ANK TI LO PROF: Type: IMPLANTABLE DEVICE | Site: ANKLE | Status: FUNCTIONAL

## 2022-02-23 DEVICE — SCREW BNE L24MM DIA3MM CORT FT ANK TI SELF DRL SLD FULL: Type: IMPLANTABLE DEVICE | Site: ANKLE | Status: FUNCTIONAL

## 2022-02-23 RX ORDER — OXYCODONE HYDROCHLORIDE AND ACETAMINOPHEN 5; 325 MG/1; MG/1
2 TABLET ORAL ONCE
Status: COMPLETED | OUTPATIENT
Start: 2022-02-23 | End: 2022-02-23

## 2022-02-23 RX ORDER — GLYCOPYRROLATE 1 MG/5 ML
SYRINGE (ML) INTRAVENOUS PRN
Status: DISCONTINUED | OUTPATIENT
Start: 2022-02-23 | End: 2022-02-23 | Stop reason: SDUPTHER

## 2022-02-23 RX ORDER — NEOSTIGMINE METHYLSULFATE 1 MG/ML
INJECTION, SOLUTION INTRAVENOUS PRN
Status: DISCONTINUED | OUTPATIENT
Start: 2022-02-23 | End: 2022-02-23 | Stop reason: SDUPTHER

## 2022-02-23 RX ORDER — KETOROLAC TROMETHAMINE 30 MG/ML
INJECTION, SOLUTION INTRAMUSCULAR; INTRAVENOUS
Status: COMPLETED
Start: 2022-02-23 | End: 2022-02-23

## 2022-02-23 RX ORDER — PROCHLORPERAZINE EDISYLATE 5 MG/ML
5 INJECTION INTRAMUSCULAR; INTRAVENOUS
Status: DISCONTINUED | OUTPATIENT
Start: 2022-02-23 | End: 2022-02-23 | Stop reason: HOSPADM

## 2022-02-23 RX ORDER — DEXAMETHASONE SODIUM PHOSPHATE 4 MG/ML
INJECTION, SOLUTION INTRA-ARTICULAR; INTRALESIONAL; INTRAMUSCULAR; INTRAVENOUS; SOFT TISSUE PRN
Status: DISCONTINUED | OUTPATIENT
Start: 2022-02-23 | End: 2022-02-23 | Stop reason: SDUPTHER

## 2022-02-23 RX ORDER — ONDANSETRON 2 MG/ML
INJECTION INTRAMUSCULAR; INTRAVENOUS PRN
Status: DISCONTINUED | OUTPATIENT
Start: 2022-02-23 | End: 2022-02-23 | Stop reason: SDUPTHER

## 2022-02-23 RX ORDER — SODIUM CHLORIDE 9 MG/ML
25 INJECTION, SOLUTION INTRAVENOUS PRN
Status: DISCONTINUED | OUTPATIENT
Start: 2022-02-23 | End: 2022-02-23 | Stop reason: HOSPADM

## 2022-02-23 RX ORDER — SODIUM CHLORIDE 0.9 % (FLUSH) 0.9 %
5-40 SYRINGE (ML) INJECTION PRN
Status: DISCONTINUED | OUTPATIENT
Start: 2022-02-23 | End: 2022-02-23 | Stop reason: HOSPADM

## 2022-02-23 RX ORDER — ROCURONIUM BROMIDE 10 MG/ML
INJECTION, SOLUTION INTRAVENOUS PRN
Status: DISCONTINUED | OUTPATIENT
Start: 2022-02-23 | End: 2022-02-23 | Stop reason: SDUPTHER

## 2022-02-23 RX ORDER — ASPIRIN 325 MG
325 TABLET, DELAYED RELEASE (ENTERIC COATED) ORAL DAILY
Qty: 60 TABLET | Refills: 3 | Status: SHIPPED | OUTPATIENT
Start: 2022-02-23

## 2022-02-23 RX ORDER — MIDAZOLAM HYDROCHLORIDE 1 MG/ML
INJECTION INTRAMUSCULAR; INTRAVENOUS PRN
Status: DISCONTINUED | OUTPATIENT
Start: 2022-02-23 | End: 2022-02-23 | Stop reason: SDUPTHER

## 2022-02-23 RX ORDER — SODIUM CHLORIDE 0.9 % (FLUSH) 0.9 %
5-40 SYRINGE (ML) INJECTION EVERY 12 HOURS SCHEDULED
Status: DISCONTINUED | OUTPATIENT
Start: 2022-02-23 | End: 2022-02-23 | Stop reason: HOSPADM

## 2022-02-23 RX ORDER — PROPOFOL 10 MG/ML
INJECTION, EMULSION INTRAVENOUS PRN
Status: DISCONTINUED | OUTPATIENT
Start: 2022-02-23 | End: 2022-02-23 | Stop reason: SDUPTHER

## 2022-02-23 RX ORDER — BUPIVACAINE HYDROCHLORIDE AND EPINEPHRINE 5; 5 MG/ML; UG/ML
INJECTION, SOLUTION EPIDURAL; INTRACAUDAL; PERINEURAL PRN
Status: DISCONTINUED | OUTPATIENT
Start: 2022-02-23 | End: 2022-02-23 | Stop reason: ALTCHOICE

## 2022-02-23 RX ORDER — KETOROLAC TROMETHAMINE 30 MG/ML
INJECTION, SOLUTION INTRAMUSCULAR; INTRAVENOUS PRN
Status: DISCONTINUED | OUTPATIENT
Start: 2022-02-23 | End: 2022-02-23 | Stop reason: SDUPTHER

## 2022-02-23 RX ORDER — OXYCODONE HYDROCHLORIDE AND ACETAMINOPHEN 5; 325 MG/1; MG/1
1 TABLET ORAL EVERY 6 HOURS PRN
Qty: 28 TABLET | Refills: 0 | Status: SHIPPED | OUTPATIENT
Start: 2022-02-23 | End: 2022-03-10 | Stop reason: SDUPTHER

## 2022-02-23 RX ORDER — FENTANYL CITRATE 50 UG/ML
INJECTION, SOLUTION INTRAMUSCULAR; INTRAVENOUS PRN
Status: DISCONTINUED | OUTPATIENT
Start: 2022-02-23 | End: 2022-02-23 | Stop reason: SDUPTHER

## 2022-02-23 RX ORDER — SODIUM CHLORIDE 9 MG/ML
INJECTION, SOLUTION INTRAVENOUS CONTINUOUS
Status: DISCONTINUED | OUTPATIENT
Start: 2022-02-23 | End: 2022-02-23 | Stop reason: HOSPADM

## 2022-02-23 RX ADMIN — Medication 3 MG: at 12:56

## 2022-02-23 RX ADMIN — FENTANYL CITRATE 50 MCG: 50 INJECTION, SOLUTION INTRAMUSCULAR; INTRAVENOUS at 13:08

## 2022-02-23 RX ADMIN — DEXAMETHASONE SODIUM PHOSPHATE 8 MG: 4 INJECTION, SOLUTION INTRAMUSCULAR; INTRAVENOUS at 11:44

## 2022-02-23 RX ADMIN — Medication 0.6 MG: at 12:56

## 2022-02-23 RX ADMIN — HYDROMORPHONE HYDROCHLORIDE 0.5 MG: 1 INJECTION, SOLUTION INTRAMUSCULAR; INTRAVENOUS; SUBCUTANEOUS at 13:39

## 2022-02-23 RX ADMIN — SODIUM CHLORIDE: 9 INJECTION, SOLUTION INTRAVENOUS at 11:33

## 2022-02-23 RX ADMIN — MIDAZOLAM 2 MG: 1 INJECTION INTRAMUSCULAR; INTRAVENOUS at 11:33

## 2022-02-23 RX ADMIN — CEFAZOLIN 3000 MG: 10 INJECTION, POWDER, FOR SOLUTION INTRAVENOUS at 11:48

## 2022-02-23 RX ADMIN — KETOROLAC TROMETHAMINE 30 MG: 30 INJECTION, SOLUTION INTRAMUSCULAR; INTRAVENOUS at 13:16

## 2022-02-23 RX ADMIN — FENTANYL CITRATE 100 MCG: 50 INJECTION, SOLUTION INTRAMUSCULAR; INTRAVENOUS at 11:41

## 2022-02-23 RX ADMIN — FENTANYL CITRATE 50 MCG: 50 INJECTION, SOLUTION INTRAMUSCULAR; INTRAVENOUS at 12:23

## 2022-02-23 RX ADMIN — ROCURONIUM BROMIDE 50 MG: 10 INJECTION, SOLUTION INTRAVENOUS at 11:41

## 2022-02-23 RX ADMIN — HYDROMORPHONE HYDROCHLORIDE 0.5 MG: 1 INJECTION, SOLUTION INTRAMUSCULAR; INTRAVENOUS; SUBCUTANEOUS at 14:10

## 2022-02-23 RX ADMIN — HYDROMORPHONE HYDROCHLORIDE 0.5 MG: 1 INJECTION, SOLUTION INTRAMUSCULAR; INTRAVENOUS; SUBCUTANEOUS at 13:23

## 2022-02-23 RX ADMIN — ONDANSETRON 4 MG: 2 INJECTION INTRAMUSCULAR; INTRAVENOUS at 11:44

## 2022-02-23 RX ADMIN — FENTANYL CITRATE 50 MCG: 50 INJECTION, SOLUTION INTRAMUSCULAR; INTRAVENOUS at 11:52

## 2022-02-23 RX ADMIN — HYDROMORPHONE HYDROCHLORIDE 0.5 MG: 1 INJECTION, SOLUTION INTRAMUSCULAR; INTRAVENOUS; SUBCUTANEOUS at 13:30

## 2022-02-23 RX ADMIN — PROPOFOL 200 MG: 10 INJECTION, EMULSION INTRAVENOUS at 11:41

## 2022-02-23 RX ADMIN — OXYCODONE HYDROCHLORIDE AND ACETAMINOPHEN 2 TABLET: 5; 325 TABLET ORAL at 14:48

## 2022-02-23 RX ADMIN — FENTANYL CITRATE 50 MCG: 50 INJECTION, SOLUTION INTRAMUSCULAR; INTRAVENOUS at 12:00

## 2022-02-23 RX ADMIN — KETOROLAC TROMETHAMINE: 30 INJECTION, SOLUTION INTRAMUSCULAR; INTRAVENOUS at 13:22

## 2022-02-23 ASSESSMENT — PULMONARY FUNCTION TESTS
PIF_VALUE: 23
PIF_VALUE: 28
PIF_VALUE: 26
PIF_VALUE: 28
PIF_VALUE: 28
PIF_VALUE: 29
PIF_VALUE: 28
PIF_VALUE: 27
PIF_VALUE: 28
PIF_VALUE: 29
PIF_VALUE: 28
PIF_VALUE: 1
PIF_VALUE: 28
PIF_VALUE: 29
PIF_VALUE: 28
PIF_VALUE: 2
PIF_VALUE: 29
PIF_VALUE: 29
PIF_VALUE: 18
PIF_VALUE: 21
PIF_VALUE: 16
PIF_VALUE: 4
PIF_VALUE: 29
PIF_VALUE: 27
PIF_VALUE: 28
PIF_VALUE: 26
PIF_VALUE: 2
PIF_VALUE: 28
PIF_VALUE: 28
PIF_VALUE: 26
PIF_VALUE: 29
PIF_VALUE: 28
PIF_VALUE: 6
PIF_VALUE: 29
PIF_VALUE: 28
PIF_VALUE: 29
PIF_VALUE: 5
PIF_VALUE: 28
PIF_VALUE: 29
PIF_VALUE: 29
PIF_VALUE: 10
PIF_VALUE: 28
PIF_VALUE: 26
PIF_VALUE: 29
PIF_VALUE: 29
PIF_VALUE: 12
PIF_VALUE: 29
PIF_VALUE: 27
PIF_VALUE: 17
PIF_VALUE: 26
PIF_VALUE: 29
PIF_VALUE: 29
PIF_VALUE: 28
PIF_VALUE: 24
PIF_VALUE: 29
PIF_VALUE: 28
PIF_VALUE: 29
PIF_VALUE: 28
PIF_VALUE: 30
PIF_VALUE: 1
PIF_VALUE: 1
PIF_VALUE: 27
PIF_VALUE: 29
PIF_VALUE: 26
PIF_VALUE: 1
PIF_VALUE: 25
PIF_VALUE: 28
PIF_VALUE: 28
PIF_VALUE: 2
PIF_VALUE: 29
PIF_VALUE: 28
PIF_VALUE: 28
PIF_VALUE: 18

## 2022-02-23 ASSESSMENT — LIFESTYLE VARIABLES: SMOKING_STATUS: 1

## 2022-02-23 ASSESSMENT — PAIN SCALES - GENERAL
PAINLEVEL_OUTOF10: 8
PAINLEVEL_OUTOF10: 7
PAINLEVEL_OUTOF10: 10

## 2022-02-23 ASSESSMENT — PAIN DESCRIPTION - ORIENTATION
ORIENTATION: RIGHT

## 2022-02-23 ASSESSMENT — PAIN DESCRIPTION - PAIN TYPE
TYPE: SURGICAL PAIN

## 2022-02-23 ASSESSMENT — PAIN DESCRIPTION - LOCATION
LOCATION: LEG

## 2022-02-23 ASSESSMENT — PAIN DESCRIPTION - DESCRIPTORS
DESCRIPTORS: ACHING
DESCRIPTORS: ACHING;BURNING

## 2022-02-23 ASSESSMENT — PAIN - FUNCTIONAL ASSESSMENT: PAIN_FUNCTIONAL_ASSESSMENT: 0-10

## 2022-02-23 NOTE — ANESTHESIA POSTPROCEDURE EVALUATION
Department of Anesthesiology  Postprocedure Note    Patient: Remberto Franks  MRN: [id number]  YOB: 1985  Date of evaluation: 2/23/2022  Time:  1:16 PM     Procedure Summary     Date: 02/23/22 Room / Location: [facility] OR 05 / [facility]    Anesthesia Start: 1133 Anesthesia Stop: 2239    Procedure: RIGHT ANKLE OPEN REDUCTION INTERNAL FIXATION (Right Ankle) Diagnosis: (RIGHT ANKLE FRACTURE)    Surgeons: Nai Arredondo MD Responsible Provider: Lisa Staley MD    Anesthesia Type: general ASA Status: 2          Anesthesia Type: general    Bar Phase I:      Bar Phase II:      Last vitals: Reviewed and per EMR flowsheets.        Anesthesia Post Evaluation    Patient location during evaluation: bedside  Patient participation: complete - patient participated  Level of consciousness: awake  Pain score: 3  Airway patency: patent  Nausea & Vomiting: no nausea and no vomiting  Complications: no  Cardiovascular status: blood pressure returned to baseline and hemodynamically stable  Respiratory status: acceptable  Hydration status: euvolemic

## 2022-02-23 NOTE — ANESTHESIA PRE PROCEDURE
Department of Anesthesiology  Preprocedure Note       Name:  Treva Yang   Age:  39 y.o.  :  1985                                          MRN:  [id number]         Date:  2022      Surgeon: Teodoro Phan):  Yoselyn Burroughs MD    Procedure: Procedure(s):  RIGHT ANKLE OPEN REDUCTION INTERNAL FIXATION  ++[employer]++    Medications prior to admission:   Prior to Admission medications    Medication Sig Start Date End Date Taking? Authorizing Provider   prazosin (MINIPRESS) 1 MG capsule Take 1 mg by mouth nightly   Yes Historical Provider, MD   QUEtiapine (SEROQUEL) 200 MG tablet Take 400 mg by mouth at bedtime    Yes Historical Provider, MD   doxepin (SINEQUAN) 25 MG capsule take 1 capsule by mouth at bedtime 21  Yes Historical Provider, MD       Current medications:    Current Facility-Administered Medications   Medication Dose Route Frequency Provider Last Rate Last Admin    0.9 % sodium chloride infusion   IntraVENous Continuous DEE Jacobs        0.9 % sodium chloride infusion  25 mL IntraVENous PRN DEE Jacobs        ceFAZolin (ANCEF) 3,000 mg in dextrose 5 % 100 mL IVPB  3,000 mg IntraVENous On Call to [address]        sodium chloride flush 0.9 % injection 5-40 mL  5-40 mL IntraVENous 2 times per day DEE Jacobs        sodium chloride flush 0.9 % injection 5-40 mL  5-40 mL IntraVENous PRN DEE Jacobs           Allergies:  No Known Allergies    Problem List:  There is no problem list on file for this patient.       Past Medical History:        Diagnosis Date    Anxious depression     Fracture of right ankle     Hypertension        Past Surgical History:        Procedure Laterality Date     SECTION      OVARIAN CYST REMOVAL         Social History:    Social History     Tobacco Use    Smoking status: Current Every Day Smoker     Types: Cigarettes    Smokeless tobacco: Never Used   Substance Use Topics    Alcohol use: Not Currently Ready to quit: Not Answered  Counseling given: Not Answered      Vital Signs (Current):   Vitals:    02/21/22 1407   Weight: 296 lb (134.3 kg)   Height: 5' 7.5\" (1.715 m)                                              BP Readings from Last 3 Encounters:   02/10/22 117/69   08/09/21 (!) 158/98       NPO Status:                                                                                 BMI:   Wt Readings from Last 3 Encounters:   02/21/22 296 lb (134.3 kg)   02/14/22 296 lb (134.3 kg)     Body mass index is 45.68 kg/m². CBC:   Lab Results   Component Value Date    WBC 5.4 08/09/2021    RBC 5.87 08/09/2021    HGB 11.7 08/09/2021    HCT 37.2 08/09/2021    MCV 63.4 08/09/2021    RDW 15.1 08/09/2021     08/09/2021       CMP:   Lab Results   Component Value Date     08/09/2021    K 4.2 08/09/2021     08/09/2021    CO2 28 08/09/2021    BUN 13 08/09/2021    CREATININE 0.6 08/09/2021    GFRAA >60 08/09/2021    LABGLOM >60 08/09/2021    GLUCOSE 104 08/09/2021    PROT 7.4 08/09/2021    CALCIUM 9.4 08/09/2021    BILITOT 0.4 08/09/2021    ALKPHOS 107 08/09/2021    AST 25 08/09/2021    ALT 35 08/09/2021       POC Tests: No results for input(s): POCGLU, POCNA, POCK, POCCL, POCBUN, POCHEMO, POCHCT in the last 72 hours.     Coags: No results found for: PROTIME, INR, APTT    HCG (If Applicable): No results found for: PREGTESTUR, PREGSERUM, HCG, HCGQUANT     ABGs: No results found for: PHART, PO2ART, XCL9PXS, VXM9MBZ, BEART, P2EMZCTF     Type & Screen (If Applicable):  No results found for: LABABO, LABRH    Drug/Infectious Status (If Applicable):  No results found for: HIV, HEPCAB    COVID-19 Screening (If Applicable): No results found for: COVID19        Anesthesia Evaluation  Patient summary reviewed and Nursing notes reviewed no history of anesthetic complications:   Airway: Mallampati: III  TM distance: >3 FB   Neck ROM: full  Mouth opening: > = 3 FB Dental: normal exam Pulmonary:normal exam    (+) current smoker                           Cardiovascular:    (+) hypertension:,                   Neuro/Psych:   (+) psychiatric history:            GI/Hepatic/Renal: Neg GI/Hepatic/Renal ROS            Endo/Other: Negative Endo/Other ROS                    Abdominal:             Vascular: negative vascular ROS. Other Findings:           Anesthesia Plan      general     ASA 2       Induction: intravenous. MIPS: Postoperative opioids intended and Prophylactic antiemetics administered. Anesthetic plan and risks discussed with patient. Plan discussed with CRNA and surgical team.            Chart reviewed, patient seen. Agree with above.   Kathy Luke MD   2/23/2022

## 2022-02-23 NOTE — PROGRESS NOTES
CLINICAL PHARMACY NOTE: MEDS TO BEDS    Total # of Prescriptions Filled: 2   The following medications were delivered to the patient:  · Percocet 5/325  · Asp 325    Additional Documentation:

## 2022-02-23 NOTE — BRIEF OP NOTE
Brief Postoperative Note      Patient: Yajaira Rosales  YOB: 1985  MRN: [id number]    Date of Procedure: 2/23/2022    Pre-Op Diagnosis: RIGHT ANKLE FRACTURE    Post-Op Diagnosis: Same       Procedure(s):  RIGHT ANKLE OPEN REDUCTION INTERNAL FIXATION    Surgeon(s):  Holden Medley MD    Assistant:  Physician Assistant: DEE Major  Resident: Amol Munoz DO    Anesthesia: General    Estimated Blood Loss (mL): Minimal    Complications: None    Specimens:   * No specimens in log *    Implants:  Implant Name Type Inv. Item Serial No.  Lot No. LRB No. Used Action   SCREW BNE L14MM DIA3.5MM BAIRON TI ST NONLOCKING HD LO PROF - [id number]  SCREW BNE L14MM DIA3.5MM BAIRON TI ST NONLOCKING HD LO PROF  [employer]  Right 1 Implanted   PLATE BONE 7 H TI LCK 3RD TBLR - [id number]  PLATE BONE 7 H TI LCK 3RD TBLR  [employer]  Right 1 Implanted   [employer] CANCELLOUS SCREW [id number] Screw/Plate/Nail/Dean   [employer]PMM  Right 2 Implanted   SCREW BNE L24MM DIA3MM BAIRON FT ANK TI SELF DRL SLD FULL - [id number]  SCREW BNE L24MM DIA3MM BAIRON FT ANK TI SELF DRL SLD FULL  [employer]  Right 1 Implanted   SCREW BNE L12MM DIA3. 5MM ANK TI LO PROF - [id number]  SCREW BNE L12MM DIA3. 5MM ANK TI LO PROF  [employer]  Right 2 Implanted   SYSTEM IMPL TI UHMWPE KNOTLESS FOR SYNDESMOSIS FIX - [id number]  SYSTEM IMPL TI UHMWPE KNOTLESS FOR SYNDESMOSIS FIX  [employer]  Right 1 Implanted         Drains: * No LDAs found *    Findings: see op note    Electronically signed by DEE Major on 2/23/2022 at 1:31 PM

## 2022-02-23 NOTE — H&P
Department of Orthopedic Surgery  History and Physical        CHIEF COMPLAINT:  Right ankle pain     HISTORY OF PRESENT ILLNESS:                 Patient is a 39 y. o. female who presents with right ankle pain after a fall.  Patient states she slipped on ice and twisted her ankle, fell to the ground and had immediate pain and inability to ambulate.  Her injury occurred on 2/10/2022. Did not hit her head or lose consciousness.  Denies numbness/tingling/paresthesias.  Denies any other orthopedic complaints at this time. Roberto Frankss states she is student at [employer] and is new to the area. Of note she does have a history of opioid abuse and is currently in a sober house.      Past Medical History:    Past Medical History             Diagnosis Date    Hypertension           Past Surgical History:    Past Surgical History   History reviewed. No pertinent surgical history. Current Medications:   Current Hospital Medications   No current facility-administered medications for this visit. Allergies:  Patient has no known allergies.     Social History:   TOBACCO:   reports that she has been smoking cigarettes. She has never used smokeless tobacco.  ETOH:   has no history on file for alcohol use. DRUGS:   has no history on file for drug use. ACTIVITIES OF DAILY LIVING:    OCCUPATION:    Family History:   Family History   History reviewed.  No pertinent family history.        REVIEW OF SYSTEMS:  CONSTITUTIONAL:  negative  EYES:  negative  HEENT:  negative  RESPIRATORY:  negative  CARDIOVASCULAR:  negative  GASTROINTESTINAL:  negative  MUSCULOSKELETAL:  Right ankle pain  NEUROLOGICAL:  negative     PHYSICAL EXAM:    VITALS:  Ht 5' 7.5\" (1.715 m)   Wt 296 lb (134.3 kg)   BMI 45.68 kg/m²   CONSTITUTIONAL:  awake, alert, cooperative, no apparent distress, and appears stated age  EYES:  Lids and lashes normal, pupils equal, round and reactive to light, extra ocular muscles intact, sclera clear, conjunctiva normal  ENT: Normocephalic, without obvious abnormality, atraumatic, sinuses nontender on palpation, external ears without lesions, oral pharynx with moist mucus membranes, tonsils without erythema or exudates, gums normal and good dentition. NECK:  Supple, symmetrical, trachea midline, no adenopathy, thyroid symmetric, not enlarged and no tenderness, skin normal  LUNGS:  CTA  CARDIOVASCULAR:  2+ radial pulses, extremities warm and well perfused  ABDOMEN:   NTTP  CHEST:  Atraumatic   GENITAL/URINARY:  deferred  NEUROLOGIC:  Awake, alert, oriented to name, place and time. Cranial nerves II-XII are grossly intact. Motor is 5 out of 5 bilaterally. Sensory is intact.  gait is normal.  MUSCULOSKELETAL:     Right lower extremity exam  Splint intact. Sensation intact to dorsal toes and distal plantar foot. She is able to move all toes. Good capillary refill. Toes warm and perfused. No tenderness at the knee. Compartments soft where able to be palpated around the splint.      DATA:    CBC:         Lab Results   Component Value Date     WBC 5.4 08/09/2021     RBC 5.87 08/09/2021     HGB 11.7 08/09/2021     HCT 37.2 08/09/2021     MCV 63.4 08/09/2021     MCH 19.9 08/09/2021     MCHC 31.5 08/09/2021     RDW 15.1 08/09/2021      08/09/2021     MPV 9.9 08/09/2021      PT/INR:  No results found for: PROTIME, INR     Radiology Review:  xrays from the ED reviewed from 2/10. 8 images including external rotation stress views demonstrate an oblique fracture through the distal fibula at the level of the syndesmosis with widening of the medial clear space and a small posterior malleolus fracture. The ankle is located. The medial clear space is closed down nicely on the post splint images.     IMPRESSION:  · Right trimalleolar equivalent ankle fracture  · opioid addiction  · HTN     PLAN:  Today we discussed her right ankle. All findings were discussed with the patient.  We discussed operative vs non operative treatment of her ankle injury and the risks and benefits of both. All questions were answered. Surgical intervention is recommended at this time. Patient would like to proceed with surgical intervention. Discussed with patient with her history of opioid abuse we would want to limit her post operative opioid use to a minimum. She states all medications are controlled at the sober house. Patient understands and would like to proceed.      I have seen and evaluated the patient and agree with the above assessment and plan on today's visit. I have performed the key components of the history and physical examination with significant findings of right ankle fracture. Plan for ORIF. I concur with the findings and plan as documented.     I explained the risks, benefits, alternatives and complications of surgery with the patient including but not limited to the risks of death, possible damage to nerves, vessels, or tendons, possible infection, possible nonunion, possible malunion, possible hardware failure, possible need for hardware removal, stiffness, as well as the possible need further surgery and unanticipated complications. The patient voiced understanding and all questions were answered. The patient elected to proceed with surgical intervention.            Informed consent was obtained for continued opioid treatment. Patient agrees to continue the current treatment and agrees the benefits of opioid treatment ( decreased pain, improved function) continue to outweigh the potential risks ( confusion, change in thinking ability, depression, dry mouth, nausea, constipation, vomiting, impaired coordination/balance, sleepiness, damage liver or kidneys, opioid-induced hyperalgesia, physical dependence, addiction, withdrawal, respiratory depression and even death).    The patient was counseled at length about the risks of jessica Covid-19 during their perioperative period and any recovery window from their procedure.   The patient was made aware that

## 2022-02-24 NOTE — OP NOTE
[address] [address]                                OPERATIVE REPORT    PATIENT NAME: Bria Juarez                    :        1985  MED REC NO:   [id number]                            ROOM:  ACCOUNT NO:   [de-identified]                           ADMIT DATE: 2022  PROVIDER:     Candance Jackson, MD    DATE OF PROCEDURE:  2022    PREOPERATIVE DIAGNOSES:  1. Right distal fibular fracture. 2.  Right syndesmosis injury. POSTOPERATIVE DIAGNOSES:  1. Right distal fibular fracture. 2.  Right syndesmosis injury. PROCEDURES PERFORMED:  1. Right distal fibular fracture open reduction and internal fixation  using Arthrex one-third tubular plates and screws. 2.  Right syndesmosis repair using Arthrex TightRope system. SURGEON:  Candance Jackson, M.D. ANESTHESIA:  1. General.  2.  Local anesthetic by surgeon consisting of approximately 10 mL of  0.25% Marcaine with epinephrine. ASSISTANTS:  1.  Dr. Mary Lin, Orthopedic Surgery Resident. 2.  Gaby Molina, physician assistant certified. She was present  throughout the procedure. Her assistance was used for preoperative  positioning, intraoperative retraction, closure, and dressing  application. Her assistance expedited the case and decreased the  surgical time. TOTAL TOURNIQUET TIME:  Approximately 41 minutes at 300 mmHg of thigh  tourniquet. ESTIMATED BLOOD LOSS:  Minimal.    FINDINGS:  1.  Oblique spiral fracture of the distal fibula that was amenable to  anatomic fixation using a lag screw followed by neutralization one-third  tubular plate. 2.  Status post fixation of the distal fibular fracture, syndesmosis  stress testing revealed widening of the medial clear space, which was  corrected with placement of the Arthrex TightRope syndesmosis repair. COMPLICATIONS:  None.     DISPOSITION:  The patient remained stable throughout the procedure. OPERATIVE INDICATIONS:  The patient is a 48-year-old female who  sustained an ankle fracture, was seen in the office, found to have  displacement and widening of the medial clear space, concerned for  syndesmotic injury and distal _____ fracture. Risks, benefits,  alternatives, and complication of surgical intervention were explained  including, but not limited to the risk of infection, damage to nerves,  vessels, or tendons, malunion, nonunion, symptomatic hardware, need for  nonweightbearing, risk of deep venous thrombosis, stiffness, need for  therapy and/or additional surgery. She understood and wished to  proceed. DESCRIPTION OF PROCEDURE:  The patient was identified in the holding  area. The right ankle was identified as the surgical site. She was  then seen by Anesthesia, taken to the operating room, placed supine on  the table, and underwent general anesthesia per Anesthesia Department. A well-padded thigh tourniquet was placed. The right lower extremity  was prepared and draped in the standard sterile fashion. Perioperative  antibiotics were provided. Surgical pause was undertaken. This  confirmed the site and procedure. The leg was exsanguinated and the tourniquet was inflated to 300 mmHg. Total tourniquet time was 41 minutes. A lateral incision developed over the tip of the fibula was extended  proximally for a total length of approximately 8 cm. Proximal  dissection was used to identify and preserve sensory nerve branches of  the peroneal nerve. The distal fracture was immobilized. This was a  spiral oblique fracture. It was debrided and able to be anatomically  reduced under direct visualization as well as under fluoroscopy. A lag  screw was then placed obliquely across the fracture, securing the  fracture nicely.   A one-third Arthrex plate was then pre-contoured and  placed across the fracture, secured in position under fluoroscopic  imaging proximally first followed by a cancellous screw distally to lag  the plate into position followed by additional nonlocking screws  proximally and distally to achieve six cortical purchases above as well  as two screws distal to the oblique fracture. This reduced the fracture  nicely. Gentle stress testing of the syndesmosis revealed widening. Therefore, the syndesmosis was repaired using the Arthrex TightRope  system. Under fluoroscopic imaging with the syndesmosis reduced, a guidewire was  placed under fluoroscopic imaging in both AP and lateral planes parallel  to the tibiotalar joint. With this in place, this was overdrilled using  the Arthrex system and placement of the Arthrex button over the medial  cortex followed by cinching the button down through the plate and with  the syndesmosis reduced, it was secured down nicely. Status post  repair, the syndesmosis was re-stressed and found to have good  stability. Final imaging; AP mortise and lateral views confirmed  excellent reduction of the ankle. Therefore, the wound was copiously  irrigated out. Tourniquet was deflated. Total tourniquet time 41  minutes. Hemostasis was achieved with electrocautery. Deep tissues  closed with 0 Vicryl, 2-0 Vicryl, skin staples, sterile dressing, and  splint was applied.         Carlyle MD Wyatt    D: 02/23/2022 15:34:52       T: 02/23/2022 15:37:06     [id number]  Job#: [id number]     Doc#: [id number]    CC:

## 2022-03-02 ENCOUNTER — TELEPHONE (OUTPATIENT)
Dept: ORTHOPEDIC SURGERY | Age: 37
End: 2022-03-02

## 2022-03-02 DIAGNOSIS — S82.891A CLOSED FRACTURE OF RIGHT ANKLE, INITIAL ENCOUNTER: Primary | ICD-10-CM

## 2022-03-03 ENCOUNTER — OFFICE VISIT (OUTPATIENT)
Dept: ORTHOPEDIC SURGERY | Age: 37
End: 2022-03-03

## 2022-03-03 VITALS — HEIGHT: 68 IN | BODY MASS INDEX: 43.5 KG/M2 | WEIGHT: 287 LBS

## 2022-03-03 DIAGNOSIS — S82.891A CLOSED FRACTURE OF RIGHT ANKLE, INITIAL ENCOUNTER: Primary | ICD-10-CM

## 2022-03-03 PROCEDURE — 99024 POSTOP FOLLOW-UP VISIT: CPT | Performed by: ORTHOPAEDIC SURGERY

## 2022-03-03 NOTE — PROGRESS NOTES
HPI:   Patient follows up today s/p open reduction internal fixation with tight rope repair of the syndesmosis for her injury to the right ankle on 2/23/2022. She has been nonweightbearing in the splint since that time. She has pain to the operative site. She also notes some decreased sensation in the toes. No chest pain or shortness of breath. No fevers or chills. No interval trauma. Physical Exam:  right ankle lateral incision is well approximated with sutures, clean, dry, and intact  No signs of infection  ROM appropriate, limitations to dorsiflexion   NVI, subjective decreased sensation to the digits  Brisk capillary refill  Plantar flexion dorsiflexion of all toes intact, limited motion of the ankle secondary to pain      Xray: x-rays of the right ankle were obtained today in the office and reviewed with the patient. 3 views: AP, lateral, and mortise : demonstrate hardware in place from ankle open reduction internal fixation with syndesmotic repair. Alignment well preserved. Impression: s/p open reduction internal fixation of right ankle with syndesmotic repair      Assessment:  Post-op s/p open reduction internal fixation of right ankle with syndesmotic repair on 2/23/2022    Plan:  Patient will be transitioned to CAM boot today. This may be removed for hygiene purposes. She may rest her foot on the ground but no weightbearing to the right lower extremity. She will follow up in 4 weeks for repeat imaging of the ankle and repeat evaluation. She is to continue her aspirin for DVT prophylaxis. 3/3/2022  Martha Farias DO    I have seen and evaluated the patient and agree with the above assessment and plan on today's visit. I have performed the key components of the history and physical examination with significant findings of postop doing well. I concur with the findings and plan as documented.     Teresita Lobato MD  3/3/2022

## 2022-03-03 NOTE — PROGRESS NOTES
Sutures removed from right ankle. A walking boot was applied to the right lower leg. Use and care instructions were reviewed with patient.     Brace supplied by and billed for by [facility]

## 2022-03-09 DIAGNOSIS — G89.18 POST-OPERATIVE PAIN: ICD-10-CM

## 2022-03-09 NOTE — TELEPHONE ENCOUNTER
Patient is requesting a medication refill, OARRS complete. Patient is 2 weeks out from open reduction internal fixation with tight rope repair of the syndesmosis for her injury to the right ankle . Patient was last seen on 3/3/22 and patients next appointment is 4/4. Patient was last given percocet 5 MG every 6 hours on 2/23.

## 2022-03-10 RX ORDER — OXYCODONE HYDROCHLORIDE AND ACETAMINOPHEN 5; 325 MG/1; MG/1
1 TABLET ORAL EVERY 6 HOURS PRN
Qty: 28 TABLET | Refills: 0 | Status: SHIPPED | OUTPATIENT
Start: 2022-03-10 | End: 2022-03-17

## 2022-03-22 DIAGNOSIS — G89.18 POST-OPERATIVE PAIN: ICD-10-CM

## 2022-03-22 DIAGNOSIS — S82.891A CLOSED FRACTURE OF RIGHT ANKLE, INITIAL ENCOUNTER: Primary | ICD-10-CM

## 2022-03-22 RX ORDER — HYDROCODONE BITARTRATE AND ACETAMINOPHEN 5; 325 MG/1; MG/1
1 TABLET ORAL EVERY 6 HOURS PRN
Qty: 28 TABLET | Refills: 0 | Status: SHIPPED | OUTPATIENT
Start: 2022-03-22 | End: 2022-03-29

## 2022-03-22 NOTE — TELEPHONE ENCOUNTER
Patient is requesting a medication refill, OARRS complete. Patient is 4 weeks out from surgery of Rt Ankle ORIF with syndesmosis repair. Patient was last seen on 3/17 and patients next appointment is 4/4. Patient was last given Percocet 5-325 q6hr PRN on 3/10.

## 2022-04-04 ENCOUNTER — OFFICE VISIT (OUTPATIENT)
Dept: ORTHOPEDIC SURGERY | Age: 37
End: 2022-04-04

## 2022-04-04 VITALS — BODY MASS INDEX: 43.65 KG/M2 | HEIGHT: 68 IN | WEIGHT: 288 LBS

## 2022-04-04 DIAGNOSIS — S82.891A CLOSED FRACTURE OF RIGHT ANKLE, INITIAL ENCOUNTER: Primary | ICD-10-CM

## 2022-04-04 PROCEDURE — 99024 POSTOP FOLLOW-UP VISIT: CPT | Performed by: ORTHOPAEDIC SURGERY

## 2022-04-04 NOTE — PROGRESS NOTES
6 weeks postop right ankle ORIF with syndesmosis repair. Overall she is doing very well. She is very pleased with results thus far. She has no pain. Physical exam: Scars maturing nicely. No signs of infection. Excellent ankle range of motion. Calf soft nontender. Neuro vas intact distally. X-rays the office today: AP mortise and lateral views of the ankle demonstrate anatomic alignment to the distal fibula fracture as well as maintained syndesmotic reduction. Impression office x-rays: Stable fixation right ankle fracture and syndesmosis    6 weeks postop    Plan repair patient was referred to therapy. May weight-bear as tolerated in the boot at this time. May wean out of the boot at 8 weeks with therapy. May advance in therapy as tolerated. Follow-up in another 4 to 6 weeks with new x-rays.

## 2022-04-25 ENCOUNTER — HOSPITAL ENCOUNTER (EMERGENCY)
Age: 37
Discharge: HOME | End: 2022-04-25
Payer: COMMERCIAL

## 2022-04-25 ENCOUNTER — APPOINTMENT (OUTPATIENT)
Dept: ULTRASOUND IMAGING | Age: 37
End: 2022-04-25
Payer: COMMERCIAL

## 2022-04-25 VITALS
DIASTOLIC BLOOD PRESSURE: 103 MMHG | HEART RATE: 63 BPM | RESPIRATION RATE: 16 BRPM | HEIGHT: 68 IN | OXYGEN SATURATION: 97 % | TEMPERATURE: 97.7 F | BODY MASS INDEX: 42.74 KG/M2 | SYSTOLIC BLOOD PRESSURE: 150 MMHG | WEIGHT: 282 LBS

## 2022-04-25 DIAGNOSIS — I10 ESSENTIAL HYPERTENSION: ICD-10-CM

## 2022-04-25 DIAGNOSIS — K29.00 ACUTE SUPERFICIAL GASTRITIS WITHOUT HEMORRHAGE: Primary | ICD-10-CM

## 2022-04-25 DIAGNOSIS — R11.0 NAUSEA: ICD-10-CM

## 2022-04-25 LAB
ALBUMIN SERPL-MCNC: 4.1 G/DL (ref 3.5–5.2)
ALP BLD-CCNC: 171 U/L (ref 35–104)
ALT SERPL-CCNC: 46 U/L (ref 0–32)
ANION GAP SERPL CALCULATED.3IONS-SCNC: 9 MMOL/L (ref 7–16)
ANISOCYTOSIS: ABNORMAL
AST SERPL-CCNC: 30 U/L (ref 0–31)
BACTERIA: ABNORMAL /HPF
BASOPHILS ABSOLUTE: 0 E9/L (ref 0–0.2)
BASOPHILS RELATIVE PERCENT: 0 % (ref 0–2)
BILIRUB SERPL-MCNC: 0.3 MG/DL (ref 0–1.2)
BILIRUBIN URINE: NEGATIVE
BLOOD, URINE: ABNORMAL
BUN BLDV-MCNC: 15 MG/DL (ref 6–20)
CALCIUM SERPL-MCNC: 9.7 MG/DL (ref 8.6–10.2)
CHLORIDE BLD-SCNC: 101 MMOL/L (ref 98–107)
CLARITY: CLEAR
CO2: 27 MMOL/L (ref 22–29)
COLOR: YELLOW
CREAT SERPL-MCNC: 0.7 MG/DL (ref 0.5–1)
EOSINOPHILS ABSOLUTE: 0.08 E9/L (ref 0.05–0.5)
EOSINOPHILS RELATIVE PERCENT: 1 % (ref 0–6)
EPITHELIAL CELLS, UA: ABNORMAL /HPF
GFR AFRICAN AMERICAN: >60
GFR NON-AFRICAN AMERICAN: >60 ML/MIN/1.73
GLUCOSE BLD-MCNC: 124 MG/DL (ref 74–99)
GLUCOSE URINE: NEGATIVE MG/DL
HCG, URINE, POC: NEGATIVE
HCT VFR BLD CALC: 36.4 % (ref 34–48)
HEMOGLOBIN: 11.3 G/DL (ref 11.5–15.5)
HYPOCHROMIA: ABNORMAL
KETONES, URINE: NEGATIVE MG/DL
LACTIC ACID: 1.5 MMOL/L (ref 0.5–2.2)
LEUKOCYTE ESTERASE, URINE: NEGATIVE
LIPASE: 54 U/L (ref 13–60)
LYMPHOCYTES ABSOLUTE: 2.74 E9/L (ref 1.5–4)
LYMPHOCYTES RELATIVE PERCENT: 33 % (ref 20–42)
Lab: NORMAL
MCH RBC QN AUTO: 19.6 PG (ref 26–35)
MCHC RBC AUTO-ENTMCNC: 31 % (ref 32–34.5)
MCV RBC AUTO: 63.1 FL (ref 80–99.9)
MONOCYTES ABSOLUTE: 0.75 E9/L (ref 0.1–0.95)
MONOCYTES RELATIVE PERCENT: 9 % (ref 2–12)
NEGATIVE QC PASS/FAIL: NORMAL
NEUTROPHILS ABSOLUTE: 4.73 E9/L (ref 1.8–7.3)
NEUTROPHILS RELATIVE PERCENT: 57 % (ref 43–80)
NITRITE, URINE: NEGATIVE
PDW BLD-RTO: 15.8 FL (ref 11.5–15)
PH UA: 6 (ref 5–9)
PLATELET # BLD: 396 E9/L (ref 130–450)
PMV BLD AUTO: 10.2 FL (ref 7–12)
POIKILOCYTES: ABNORMAL
POLYCHROMASIA: ABNORMAL
POSITIVE QC PASS/FAIL: NORMAL
POTASSIUM REFLEX MAGNESIUM: 4.3 MMOL/L (ref 3.5–5)
PROTEIN UA: 30 MG/DL
RBC # BLD: 5.77 E12/L (ref 3.5–5.5)
RBC UA: ABNORMAL /HPF (ref 0–2)
SMUDGE CELLS: ABNORMAL
SODIUM BLD-SCNC: 137 MMOL/L (ref 132–146)
SPECIFIC GRAVITY UA: >=1.03 (ref 1–1.03)
TARGET CELLS: ABNORMAL
TOTAL PROTEIN: 7.7 G/DL (ref 6.4–8.3)
UROBILINOGEN, URINE: 0.2 E.U./DL
WBC # BLD: 8.3 E9/L (ref 4.5–11.5)
WBC UA: ABNORMAL /HPF (ref 0–5)

## 2022-04-25 PROCEDURE — 83690 ASSAY OF LIPASE: CPT

## 2022-04-25 PROCEDURE — 80053 COMPREHEN METABOLIC PANEL: CPT

## 2022-04-25 PROCEDURE — 76705 ECHO EXAM OF ABDOMEN: CPT

## 2022-04-25 PROCEDURE — 99284 EMERGENCY DEPT VISIT MOD MDM: CPT

## 2022-04-25 PROCEDURE — 81001 URINALYSIS AUTO W/SCOPE: CPT

## 2022-04-25 PROCEDURE — 85025 COMPLETE CBC W/AUTO DIFF WBC: CPT

## 2022-04-25 PROCEDURE — 83605 ASSAY OF LACTIC ACID: CPT

## 2022-04-25 PROCEDURE — 6370000000 HC RX 637 (ALT 250 FOR IP): Performed by: PHYSICIAN ASSISTANT

## 2022-04-25 RX ORDER — ONDANSETRON 4 MG/1
4 TABLET, FILM COATED ORAL EVERY 8 HOURS PRN
Qty: 12 TABLET | Refills: 0 | Status: SHIPPED | OUTPATIENT
Start: 2022-04-25 | End: 2022-04-30

## 2022-04-25 RX ORDER — PANTOPRAZOLE SODIUM 40 MG/1
40 TABLET, DELAYED RELEASE ORAL
Qty: 30 TABLET | Refills: 0 | Status: SHIPPED | OUTPATIENT
Start: 2022-04-25

## 2022-04-25 RX ADMIN — LIDOCAINE HYDROCHLORIDE: 20 SOLUTION ORAL; TOPICAL at 19:31

## 2022-04-25 NOTE — ED PROVIDER NOTES
FIRST PROVIDER CONTACT ASSESSMENT NOTE           Department of Emergency Medicine                 First Provider Note            22  4:14 PM EDT    Date of Encounter: No admission date for patient encounter. Patient Name: Josie Broussard  : [birth year]  MRN: [id number]    Chief Complaint: Nausea      History of Present Illness:   Josie Broussard is a 39 y.o. female who presents to the ED for nausea and dry heaves. No diarrhea. Epigastric pain. Still has gallbladder. Already seen by . Given Zofran. Having chills. Focused Physical Exam:  VS:    ED Triage Vitals   BP Temp Temp src Pulse Resp SpO2 Height Weight   -- -- -- -- -- -- -- --        Physical Ex: Constitutional: Alert and non-toxic. Medical History:  has a past medical history of Anxious depression, Fracture of right ankle, and Hypertension. Surgical History:  has a past surgical history that includes ovarian cyst removal;  section; and Ankle fracture surgery (Right, 2022). Social History:  reports that she has been smoking cigarettes. She has never used smokeless tobacco. She reports previous alcohol use. She reports previous drug use. Family History: family history is not on file. Allergies: Patient has no known allergies.      Initial Plan of Care: Initiate Treatment-Testing, Proceed toTreatment Area When Bed Available for ED Attending/MLP to Continue Care      ---END OF FIRST PROVIDER CONTACT ASSESSMENT NOTE---  Electronically signed by Mer Zabala PA-C   DD: 22       Mer Zabala PA-C  22 [id number]

## 2022-04-25 NOTE — Clinical Note
Zackery Shaffer was seen and treated in our emergency department on 4/25/2022. She may return to work on 04/26/2022. If you have any questions or concerns, please don't hesitate to call.       DEE Rodriguez

## 2022-04-25 NOTE — ED PROVIDER NOTES
[facility]  Department of Emergency Medicine   ED  Encounter Note  Admit Date/RoomTime: 2022  5:27 PM  ED Room: 32/32    NAME: Elida Perez  : 1985  MRN: [id number]     Chief Complaint:  Nausea    History of Present Illness       Elida Perez is a 39 y.o. old female who presents to the emergency department by private vehicle, for gradual onset aching, sharp pain in the epigastrium without radiation which began a few day(s) prior to arrival.   There has been similar episodes in the past.  However, patient states that this seems more severe than previous episodes. Since onset the symptoms have been gradually worsening. The pain is associated abdominal fullness after meals. Patient reports that she feels that she has to belch but she is unable to. The pain is aggravated by eating, drinking and pressure on area of discomfort and relieved by nothing. There has been NO chest pain, shortness of breath, vomiting, diarrhea, fever, chills, urinary symptoms, vaginal discharge or vaginal bleeding. .   No LMP recorded. .   OB History    No obstetric history on file. [facility] GYN history non-contributory or N/A. Patient does admit to significant past medical history of heartburn in the past for which she does not take any daily medication to alleviate this. .  ROS   Pertinent positives and negatives are stated within HPI, all other systems reviewed and are negative. Past Medical History:  has a past medical history of Anxious depression, Fracture of right ankle, and Hypertension. Surgical History has a past surgical history that includes ovarian cyst removal;  section; and Ankle fracture surgery (Right, 2022). Social History:  reports that she has been smoking cigarettes. She has never used smokeless tobacco. She reports previous alcohol use. She reports previous drug use. Family History: family history is not on file.      Allergies: Patient has no known allergies. Physical Exam   Oxygen Saturation Interpretation: Normal on room air analysis. ED Triage Vitals [04/25/22 1614]   BP Temp Temp src Pulse Resp SpO2 Height Weight   (!) 174/114 97.7 °F (36.5 °C) -- 90 16 97 % 5' 7.5\" (1.715 m) 282 lb (127.9 kg)        Physical Exam  General Appearance/Constitutional:  Alert, development consistent with age. HEENT:  NC/NT. PERRLA. Airway patent. Neck:  Supple. No lymphadenopathy. Respiratory:  No retractions. Lungs Clear to auscultation and breath sounds equal.  CV:  Regular rate and rhythm. GI:  normal appearing, non-distended with no visible hernias. Bowel sounds: normal bowel sounds. Tenderness: There is moderate tenderness present - located in the epigastrium. , Manley's Sign: negative, Rovsing's Sign: negative. Liver: non-tender. Spleen:  non-tender. Back: CVA Tenderness: No CVA tenderness. : /Pelvic examination deferred / declined. Integument:  Normal turgor. Warm, dry, without visible rash, unless noted elsewhere. Lymphatics: No edema, cap.refill <3sec. Neurological:  Orientation age-appropriate. Motor functions intact.     Lab / Imaging Results   (All laboratory and radiology results have been personally reviewed by myself)  Labs:  Results for orders placed or performed during the hospital encounter of 04/25/22   CBC with Auto Differential   Result Value Ref Range    WBC 8.3 4.5 - 11.5 E9/L    RBC 5.77 (H) 3.50 - 5.50 E12/L    Hemoglobin 11.3 (L) 11.5 - 15.5 g/dL    Hematocrit 36.4 34.0 - 48.0 %    MCV 63.1 (L) 80.0 - 99.9 fL    MCH 19.6 (L) 26.0 - 35.0 pg    MCHC 31.0 (L) 32.0 - 34.5 %    RDW 15.8 (H) 11.5 - 15.0 fL    Platelets 388 123 - 378 E9/L    MPV 10.2 7.0 - 12.0 fL   Comprehensive Metabolic Panel w/ Reflex to MG   Result Value Ref Range    Sodium 137 132 - 146 mmol/L    Potassium reflex Magnesium 4.3 3.5 - 5.0 mmol/L    Chloride 101 98 - 107 mmol/L    CO2 27 22 - 29 mmol/L    Anion Gap 9 7 - 16 mmol/L    Glucose 124 (H) 74 - 99 mg/dL    BUN 15 6 - 20 mg/dL    CREATININE 0.7 0.5 - 1.0 mg/dL    GFR Non-African American >60 >=60 mL/min/1.73    GFR African American >60     Calcium 9.7 8.6 - 10.2 mg/dL    Total Protein 7.7 6.4 - 8.3 g/dL    Albumin 4.1 3.5 - 5.2 g/dL    Total Bilirubin 0.3 0.0 - 1.2 mg/dL    Alkaline Phosphatase 171 (H) 35 - 104 U/L    ALT 46 (H) 0 - 32 U/L    AST 30 0 - 31 U/L   Lactic Acid   Result Value Ref Range    Lactic Acid 1.5 0.5 - 2.2 mmol/L   Lipase   Result Value Ref Range    Lipase 54 13 - 60 U/L   Urinalysis   Result Value Ref Range    Color, UA Yellow Straw/Yellow    Clarity, UA Clear Clear    Glucose, Ur Negative Negative mg/dL    Bilirubin Urine Negative Negative    Ketones, Urine Negative Negative mg/dL    Specific Gravity, UA >=1.030 1.005 - 1.030    Blood, Urine LARGE (A) Negative    pH, UA 6.0 5.0 - 9.0    Protein, UA 30 (A) Negative mg/dL    Urobilinogen, Urine 0.2 <2.0 E.U./dL    Nitrite, Urine Negative Negative    Leukocyte Esterase, Urine Negative Negative   Microscopic Urinalysis   Result Value Ref Range    WBC, UA 2-5 0 - 5 /HPF    RBC, UA 10-20 (A) 0 - 2 /HPF    Epithelial Cells, UA MODERATE /HPF    Bacteria, UA RARE (A) None Seen /HPF   POC Pregnancy Urine   Result Value Ref Range    HCG, Urine, POC Negative Negative    Lot Number [id number]     Positive QC Pass/Fail Acceptable     Negative QC Pass/Fail Acceptable      Imaging: All Radiology results interpreted by Radiologist unless otherwise noted. US ABDOMEN LIMITED   Final Result   Suspect mild fatty liver. Suboptimally distended gallbladder demonstrating no definite sonographic   evidence of acute cholecystitis.            ED Course / Medical Decision Making     Medications   aluminum & magnesium hydroxide-simethicone (MAALOX) 30 mL, lidocaine viscous hcl (XYLOCAINE) 5 mL (GI COCKTAIL) ( Oral Given 4/25/22 1931)          Re-Evaluations:  4/25/22      Time: 7:35 PM  Patients condition remains unchanged. Results of imaging, lab work, and urinalysis discussed at this time. Patient notified that she most likely has gastritis and at this time I will be ordering GI cocktail for her to take to see if it alleviates her epigastric pain. Plan for discharged home discussed pending good relief of epigastric pain and nausea following GI cocktail. Patient agreeable. All questions answered. Time: 7:55 PM  Patient reevaluated at this time and she reported that her nausea and abdominal pain was \"completely gone\" since receiving GI cocktail. Patient reported that she feels well to go home. Plan for discharge to home with diagnosis of gastritis and appropriateness for follow-up to GI was discussed. Patient agreeable. Consultations:             None    Procedures:   none    MDM: Patient reported today for complaints of several days of abdominal pain with nausea due to no known injury or exposure. Blood work resulted within normal limits. Urinalysis did demonstrate some blood which the patient was recommended to follow-up with her family doctor for repeat urinalysis in approximately 1 week to assess for signs of blood at that time as the remainder of her blood work is within normal limits. Ultrasound of the abdomen demonstrated possible fatty liver disease which was discussed with the patient was also recommended to follow-up with her primary care provider for further evaluation as this is not an emergent finding at this time. Patient responded well to GI cocktail stating that her abdominal pain and nausea was completely resolved. Based on the patient's chief complaint, reported history, and improvement following GI cocktail, I feel the patient most likely has gastritis without hemorrhage at this time. Patient was educated on etiology of gastritis as well as the importance of daily proton pump inhibitor for maintenance.   She was given [employer] to establish new primary care provider for follow-up in the future as well as general surgery for consult on EGD in the future. Patient appropriate for outpatient treatment at this time she is alert and oriented, no acute distress and afebrile. She has no acute pathology that is emergent noted on imaging, blood work, urinalysis at this time. Patient did have repeated hypertensive readings on exam today for which she was counseled. Patient reported that she is not currently taking anything for hypertension she was recommended to follow-up with PCP for further evaluation as her blood pressure is not within therapeutic window. This is not an emergent situation at this time as she has no red flag symptoms of headache, dizziness, chest pain. However, this is important for maintenance in future. Patient recommended to return to ER with new or worsening symptoms. Patient states she is both understandable and agreeable to this plan. Plan of Care/Counseling:  DEE Heaton reviewed today's visit with the patient in addition to providing specific details for the plan of care and counseling regarding the diagnosis and prognosis. Questions are answered at this time and are agreeable with the plan. Assessment      1. Acute superficial gastritis without hemorrhage    2. Nausea    3. Essential hypertension      This patient's ED course included: a personal history and physicial examination, re-evaluation prior to disposition and complex medical decision making and emergency management  This patient has remained hemodynamically stable and improved during their ED course. Plan   Discharged home  Patient condition is good.     New Medications     New Prescriptions    ONDANSETRON (ZOFRAN) 4 MG TABLET    Take 1 tablet by mouth every 8 hours as needed for Nausea or Vomiting    PANTOPRAZOLE (PROTONIX) 40 MG TABLET    Take 1 tablet by mouth every morning (before breakfast)     Electronically signed by DEE Heaton   DD: 4/25/22  **This report was transcribed using voice recognition software. Every effort was made to ensure accuracy; however, inadvertent computerized transcription errors may be present.   [address] [place] [address]  04/25/22 2000

## 2022-07-12 ENCOUNTER — HOSPITAL ENCOUNTER (INPATIENT)
Age: 37
LOS: 6 days | Discharge: HOME | DRG: 753 | End: 2022-07-19
Attending: STUDENT IN AN ORGANIZED HEALTH CARE EDUCATION/TRAINING PROGRAM | Admitting: PSYCHIATRY & NEUROLOGY
Payer: COMMERCIAL

## 2022-07-12 DIAGNOSIS — R45.851 SUICIDAL IDEATION: Primary | ICD-10-CM

## 2022-07-12 LAB
ACETAMINOPHEN LEVEL: <5 MCG/ML (ref 10–30)
ALBUMIN SERPL-MCNC: 3.9 G/DL (ref 3.5–5.2)
ALP BLD-CCNC: 115 U/L (ref 35–104)
ALT SERPL-CCNC: 27 U/L (ref 0–32)
AMPHETAMINE SCREEN, URINE: NOT DETECTED
ANION GAP SERPL CALCULATED.3IONS-SCNC: 12 MMOL/L (ref 7–16)
ANISOCYTOSIS: ABNORMAL
AST SERPL-CCNC: 22 U/L (ref 0–31)
ATYPICAL LYMPHOCYTE RELATIVE PERCENT: 11.5 % (ref 0–4)
BACTERIA: ABNORMAL /HPF
BARBITURATE SCREEN URINE: NOT DETECTED
BASOPHILS ABSOLUTE: 0.04 E9/L (ref 0–0.2)
BASOPHILS RELATIVE PERCENT: 0.9 % (ref 0–2)
BENZODIAZEPINE SCREEN, URINE: NOT DETECTED
BILIRUB SERPL-MCNC: 0.5 MG/DL (ref 0–1.2)
BILIRUBIN URINE: ABNORMAL
BLOOD, URINE: ABNORMAL
BUN BLDV-MCNC: 7 MG/DL (ref 6–20)
CALCIUM SERPL-MCNC: 9.2 MG/DL (ref 8.6–10.2)
CANNABINOID SCREEN URINE: POSITIVE
CHLORIDE BLD-SCNC: 104 MMOL/L (ref 98–107)
CLARITY: ABNORMAL
CO2: 24 MMOL/L (ref 22–29)
COCAINE METABOLITE SCREEN URINE: POSITIVE
COLOR: YELLOW
CREAT SERPL-MCNC: 0.7 MG/DL (ref 0.5–1)
EOSINOPHILS ABSOLUTE: 0.04 E9/L (ref 0.05–0.5)
EOSINOPHILS RELATIVE PERCENT: 0.9 % (ref 0–6)
ETHANOL: <10 MG/DL (ref 0–0.08)
FENTANYL SCREEN, URINE: NOT DETECTED
GFR AFRICAN AMERICAN: >60
GFR NON-AFRICAN AMERICAN: >60 ML/MIN/1.73
GLUCOSE BLD-MCNC: 109 MG/DL (ref 74–99)
GLUCOSE URINE: NEGATIVE MG/DL
HCG, URINE, POC: NEGATIVE
HCT VFR BLD CALC: 38.5 % (ref 34–48)
HEMOGLOBIN: 11.9 G/DL (ref 11.5–15.5)
KETONES, URINE: ABNORMAL MG/DL
LEUKOCYTE ESTERASE, URINE: NEGATIVE
LYMPHOCYTES ABSOLUTE: 2.67 E9/L (ref 1.5–4)
LYMPHOCYTES RELATIVE PERCENT: 46.9 % (ref 20–42)
Lab: ABNORMAL
Lab: NORMAL
MAGNESIUM: 2 MG/DL (ref 1.6–2.6)
MCH RBC QN AUTO: 19.3 PG (ref 26–35)
MCHC RBC AUTO-ENTMCNC: 30.9 % (ref 32–34.5)
MCV RBC AUTO: 62.6 FL (ref 80–99.9)
METHADONE SCREEN, URINE: NOT DETECTED
MONOCYTES ABSOLUTE: 0.32 E9/L (ref 0.1–0.95)
MONOCYTES RELATIVE PERCENT: 7.1 % (ref 2–12)
MYELOCYTE PERCENT: 0.9 % (ref 0–0)
NEGATIVE QC PASS/FAIL: NORMAL
NEUTROPHILS ABSOLUTE: 1.52 E9/L (ref 1.8–7.3)
NEUTROPHILS RELATIVE PERCENT: 31.8 % (ref 43–80)
NITRITE, URINE: NEGATIVE
OPIATE SCREEN URINE: NOT DETECTED
OVALOCYTES: ABNORMAL
OXYCODONE URINE: NOT DETECTED
PDW BLD-RTO: 15.9 FL (ref 11.5–15)
PH UA: 6 (ref 5–9)
PHENCYCLIDINE SCREEN URINE: NOT DETECTED
PLATELET # BLD: 341 E9/L (ref 130–450)
PMV BLD AUTO: 10.9 FL (ref 7–12)
POLYCHROMASIA: ABNORMAL
POSITIVE QC PASS/FAIL: NORMAL
POTASSIUM REFLEX MAGNESIUM: 3.2 MMOL/L (ref 3.5–5)
PROTEIN UA: ABNORMAL MG/DL
RBC # BLD: 6.15 E12/L (ref 3.5–5.5)
RBC UA: ABNORMAL /HPF (ref 0–2)
SALICYLATE, SERUM: <0.3 MG/DL (ref 0–30)
SARS-COV-2, NAAT: NOT DETECTED
SODIUM BLD-SCNC: 140 MMOL/L (ref 132–146)
SPECIFIC GRAVITY UA: >=1.03 (ref 1–1.03)
TARGET CELLS: ABNORMAL
TOTAL PROTEIN: 7.4 G/DL (ref 6.4–8.3)
TRICYCLIC ANTIDEPRESSANTS SCREEN SERUM: NEGATIVE NG/ML
UROBILINOGEN, URINE: 2 E.U./DL
WBC # BLD: 4.6 E9/L (ref 4.5–11.5)
WBC UA: ABNORMAL /HPF (ref 0–5)

## 2022-07-12 PROCEDURE — 87491 CHLMYD TRACH DNA AMP PROBE: CPT

## 2022-07-12 PROCEDURE — 99285 EMERGENCY DEPT VISIT HI MDM: CPT

## 2022-07-12 PROCEDURE — 36415 COLL VENOUS BLD VENIPUNCTURE: CPT

## 2022-07-12 PROCEDURE — 93005 ELECTROCARDIOGRAM TRACING: CPT | Performed by: PHYSICIAN ASSISTANT

## 2022-07-12 PROCEDURE — 80143 DRUG ASSAY ACETAMINOPHEN: CPT

## 2022-07-12 PROCEDURE — 82550 ASSAY OF CK (CPK): CPT

## 2022-07-12 PROCEDURE — 80307 DRUG TEST PRSMV CHEM ANLYZR: CPT

## 2022-07-12 PROCEDURE — 87635 SARS-COV-2 COVID-19 AMP PRB: CPT

## 2022-07-12 PROCEDURE — 83735 ASSAY OF MAGNESIUM: CPT

## 2022-07-12 PROCEDURE — 80179 DRUG ASSAY SALICYLATE: CPT

## 2022-07-12 PROCEDURE — 81001 URINALYSIS AUTO W/SCOPE: CPT

## 2022-07-12 PROCEDURE — 80053 COMPREHEN METABOLIC PANEL: CPT

## 2022-07-12 PROCEDURE — 87591 N.GONORRHOEAE DNA AMP PROB: CPT

## 2022-07-12 PROCEDURE — 85025 COMPLETE CBC W/AUTO DIFF WBC: CPT

## 2022-07-12 PROCEDURE — 82077 ASSAY SPEC XCP UR&BREATH IA: CPT

## 2022-07-12 RX ORDER — CEFTRIAXONE 1 G/1
500 INJECTION, POWDER, FOR SOLUTION INTRAMUSCULAR; INTRAVENOUS ONCE
Status: DISCONTINUED | OUTPATIENT
Start: 2022-07-12 | End: 2022-07-19 | Stop reason: HOSPADM

## 2022-07-12 RX ORDER — DOXYCYCLINE HYCLATE 100 MG/1
100 CAPSULE ORAL ONCE
Status: DISCONTINUED | OUTPATIENT
Start: 2022-07-12 | End: 2022-07-13 | Stop reason: ALTCHOICE

## 2022-07-13 PROBLEM — F32.9 MDD (MAJOR DEPRESSIVE DISORDER), SINGLE EPISODE: Status: ACTIVE | Noted: 2022-07-13

## 2022-07-13 LAB
EKG ATRIAL RATE: 57 BPM
EKG P AXIS: 40 DEGREES
EKG P-R INTERVAL: 138 MS
EKG Q-T INTERVAL: 466 MS
EKG QRS DURATION: 92 MS
EKG QTC CALCULATION (BAZETT): 453 MS
EKG R AXIS: 24 DEGREES
EKG T AXIS: 44 DEGREES
EKG VENTRICULAR RATE: 57 BPM
TOTAL CK: 137 U/L (ref 20–180)

## 2022-07-13 PROCEDURE — 6370000000 HC RX 637 (ALT 250 FOR IP): Performed by: STUDENT IN AN ORGANIZED HEALTH CARE EDUCATION/TRAINING PROGRAM

## 2022-07-13 PROCEDURE — 6370000000 HC RX 637 (ALT 250 FOR IP): Performed by: PSYCHIATRY & NEUROLOGY

## 2022-07-13 PROCEDURE — 1240000000 HC EMOTIONAL WELLNESS R&B

## 2022-07-13 RX ORDER — FOLIC ACID 1 MG/1
1 TABLET ORAL DAILY
Status: DISCONTINUED | OUTPATIENT
Start: 2022-07-13 | End: 2022-07-19 | Stop reason: HOSPADM

## 2022-07-13 RX ORDER — NICOTINE 21 MG/24HR
1 PATCH, TRANSDERMAL 24 HOURS TRANSDERMAL DAILY
Status: DISCONTINUED | OUTPATIENT
Start: 2022-07-13 | End: 2022-07-19 | Stop reason: HOSPADM

## 2022-07-13 RX ORDER — LANOLIN ALCOHOL/MO/W.PET/CERES
3 CREAM (GRAM) TOPICAL NIGHTLY
Status: DISCONTINUED | OUTPATIENT
Start: 2022-07-13 | End: 2022-07-19 | Stop reason: HOSPADM

## 2022-07-13 RX ORDER — HYDROXYZINE PAMOATE 50 MG/1
50 CAPSULE ORAL 3 TIMES DAILY PRN
Status: DISCONTINUED | OUTPATIENT
Start: 2022-07-13 | End: 2022-07-19 | Stop reason: HOSPADM

## 2022-07-13 RX ORDER — DIVALPROEX SODIUM 500 MG/1
500 TABLET, EXTENDED RELEASE ORAL 2 TIMES DAILY
Status: DISCONTINUED | OUTPATIENT
Start: 2022-07-13 | End: 2022-07-16

## 2022-07-13 RX ORDER — ACETAMINOPHEN 325 MG/1
650 TABLET ORAL EVERY 6 HOURS PRN
Status: DISCONTINUED | OUTPATIENT
Start: 2022-07-13 | End: 2022-07-19 | Stop reason: HOSPADM

## 2022-07-13 RX ORDER — HALOPERIDOL 5 MG
5 TABLET ORAL EVERY 6 HOURS PRN
Status: DISCONTINUED | OUTPATIENT
Start: 2022-07-13 | End: 2022-07-19 | Stop reason: HOSPADM

## 2022-07-13 RX ORDER — CHLORDIAZEPOXIDE HYDROCHLORIDE 25 MG/1
25 CAPSULE, GELATIN COATED ORAL EVERY 4 HOURS
Status: DISCONTINUED | OUTPATIENT
Start: 2022-07-13 | End: 2022-07-14

## 2022-07-13 RX ORDER — MAGNESIUM HYDROXIDE/ALUMINUM HYDROXICE/SIMETHICONE 120; 1200; 1200 MG/30ML; MG/30ML; MG/30ML
30 SUSPENSION ORAL PRN
Status: DISCONTINUED | OUTPATIENT
Start: 2022-07-13 | End: 2022-07-19 | Stop reason: HOSPADM

## 2022-07-13 RX ORDER — MULTIVITAMIN WITH IRON
1 TABLET ORAL DAILY
Status: DISCONTINUED | OUTPATIENT
Start: 2022-07-13 | End: 2022-07-19 | Stop reason: HOSPADM

## 2022-07-13 RX ORDER — HALOPERIDOL 5 MG/ML
5 INJECTION INTRAMUSCULAR EVERY 6 HOURS PRN
Status: DISCONTINUED | OUTPATIENT
Start: 2022-07-13 | End: 2022-07-19 | Stop reason: HOSPADM

## 2022-07-13 RX ORDER — GAUZE BANDAGE 2" X 2"
100 BANDAGE TOPICAL DAILY
Status: DISCONTINUED | OUTPATIENT
Start: 2022-07-13 | End: 2022-07-19 | Stop reason: HOSPADM

## 2022-07-13 RX ADMIN — POTASSIUM BICARBONATE 40 MEQ: 782 TABLET, EFFERVESCENT ORAL at 08:32

## 2022-07-13 RX ADMIN — FOLIC ACID 1 MG: 1 TABLET ORAL at 20:25

## 2022-07-13 RX ADMIN — MULTIVITAMIN TABLET 1 TABLET: TABLET at 20:25

## 2022-07-13 RX ADMIN — Medication 100 MG: at 20:25

## 2022-07-13 RX ADMIN — CHLORDIAZEPOXIDE HYDROCHLORIDE 25 MG: 25 CAPSULE ORAL at 20:24

## 2022-07-13 RX ADMIN — MELATONIN 3 MG ORAL TABLET 3 MG: 3 TABLET ORAL at 20:25

## 2022-07-13 RX ADMIN — CHLORDIAZEPOXIDE HYDROCHLORIDE 25 MG: 25 CAPSULE ORAL at 23:00

## 2022-07-13 RX ADMIN — DIVALPROEX SODIUM 500 MG: 500 TABLET, EXTENDED RELEASE ORAL at 20:25

## 2022-07-13 RX ADMIN — HYDROXYZINE PAMOATE 50 MG: 50 CAPSULE ORAL at 20:26

## 2022-07-13 ASSESSMENT — PAIN - FUNCTIONAL ASSESSMENT: PAIN_FUNCTIONAL_ASSESSMENT: ACTIVITIES ARE NOT PREVENTED

## 2022-07-13 ASSESSMENT — SLEEP AND FATIGUE QUESTIONNAIRES
DO YOU HAVE DIFFICULTY SLEEPING: NO
SLEEP PATTERN: DISTURBED/INTERRUPTED SLEEP
DO YOU USE A SLEEP AID: NO

## 2022-07-13 ASSESSMENT — PAIN SCALES - GENERAL: PAINLEVEL_OUTOF10: 0

## 2022-07-13 ASSESSMENT — LIFESTYLE VARIABLES
HOW OFTEN DO YOU HAVE A DRINK CONTAINING ALCOHOL: 4 OR MORE TIMES A WEEK
HOW MANY STANDARD DRINKS CONTAINING ALCOHOL DO YOU HAVE ON A TYPICAL DAY: 10 OR MORE

## 2022-07-13 ASSESSMENT — PATIENT HEALTH QUESTIONNAIRE - PHQ9: SUM OF ALL RESPONSES TO PHQ QUESTIONS 1-9: 18

## 2022-07-13 NOTE — ED NOTES
Behavioral Health Crisis Assessment      Chief Complaint: Pt reported that she is currently here because she wanted to end her life and she wanted to overdose on her medications or substances. Per ED chief complaint \"PT has hx of si with admission , +SI with plan to run into traffic, hx drug use of cocaine, fentynl, weed and ETOH\"    Mental Status Exam: Pt is alert and oriented x4. Pt's mood is depressed, affect is flat. Pt was lethargic during assessment. Pt's eye contact is poor, speech is mumbled, rate is normal, volume is low. Pt's thought process is logical, thought content is normal. Pt is a fair historian. Pt's insight and judgement is poor. Pt was laying down during whole assessment. Pt admits to SI with a plan to overdose, denied HI, AVH. Legal Status  [] Voluntary:  [x] Involuntary, Issued by: ED Physician     Gender  [] Male [x] Female [] Transgender  [] Other    Sexual Orientation    [x] Heterosexual [] Homosexual [] Bisexual [] Other    Brief Clinical Summary: Pt reported that she has been using different substance including crack/ cocaine and alcohol. Pt reported that she has been using these substance every day and she last used them before coming into the hospital. Pt stated that she she been drinking alcohol 2-3 times per weeks drinking multiple beers or 1 pint of liquor. Pt stated that she has had 3 DUIs in the past and was on house arrest. Pt denied being on probation or parole at this time. Pt reported that she was at [facility] in the past for substance use and then went to the [facility] for sober living. Pt denied being violent or having any history of history of violence. Pt stated that she has been homeless and was selling herself on the street and she was taken into a carley home and had to escape. Pt stated that when she got out of the home she called her father to come and get her.  Pt stated that her dad is a good support for her when she is trying to get help but they have a strained relationship. Pt reported that she recently took her medication in attempt to end her life a few days ago. Pt is unsure what medications she took and how many she took. Pt reported that she first attempted to end her life when she was 14 when she drank bleach. Pt stated that she has been having thoughts about wanting to end her life and she is having them currently. Pt reported that she has a plan to overdose on mediations, substances, or to run into traffic. Pt also stated that she has been \"selling herself on the street\" and she feels that one of the men would end her life when they \"were done with me. 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 Pt reported to having little interest and pleasure in doing things and feeling down, depressed and hopeless. Pt stated that she is active with mental health services in the community but she does not remember the agency that she has been going to. Pt was on medications but stated that she has not been taking them. Pt stated that she was set up with services through the [facility] and was seeing Kathleen Klukarni. Pt reported to having previous mental health hospitalizations multiple times for depression/ suicide. Pt reported to having suicidal thoughts with a plan since she relapsed at the end of may. Pt reported that she has not had any sleep for 3 days and her appetite has been fine. Collateral Information: No collateral information was obtained at this time. Risk Factors: Pt has had multiple hospitalizations, pt has a history of trauma, pt is homeless, pt has a history of substance use, pt has had multiple DUIs, pt has limited support in place, pt is unsure who her mental health provider is, pt is unemployed, pt has multiple suicide attempts, pt has a mental health diagnosis of bipolar, depression, anxiety, and PTSD, pt is not compliant with medications at this time.       Protective Factors: Pt has some education/ college education, pt has no access to weapons, pt is future focused, pt would like help with her substance use, pt has a good support system when she is not using substances. Suicidal Ideations:   [x] Reports:    [x] Past [x] Present   [] Denies    Suicide Attempts:  [x] Reports: multiple attempts, pt attempted a few days ago to take multiple different medications, pt currently has a plan to overdose. [] Denies    C-SSRS Screening Completed by RN: Current Suicide Risk:  [] No Risk [] Low [] Moderate [x] High    Homicidal Ideations  [] Reports:   [] Past [] Present   [x] Denies     Self Injurious/Self Mutilation Behaviors:   [] Reports:    [] Past [] Present   [x] Denies    Hallucinations/Delusions   [] Reports:   [x] Denies     Substance Use/Alcohol Use/Addiction:   [x] Reports: crack/ cocaine and alcohol   [] Denies   [x] SBIRT Screen Complete. Current or Past Substance Abuse Treatment  [x] Yes, When and Where: pt stated that she was at [place] [address] in the past and then went to sober living at the [facility]. [] No    Current or Past Mental Health Treatment:  [x] Yes, When and Where: pt reported that she is active but is unsure what the name is, pt stated that she was recently at [facility]   [] No    Legal Issues:  [x]  Yes (Specify) pt reported that she has had 3 DUIs and was on house arrest a few years ago  []  No    Access to Weapons:  []  Yes (Specify)  [x]  No    Trauma History  [x] Reports: pt reported a history of trauma and a diagnosis of PTSD  [] Denies     Living Situation: Pt is currently homeless and has been living on the streets. Employment: Pt is currently unemployed and reported that she has been selling herself on the streets. Education Level: Some college    Violence Risk Screenin. Have you ever thought about hurting someone? [x]  No  []  Yes (Ask the questions listed below)   When?  Did you follow through with the thoughts?       [x] No     [] Yes- When and what happened? 2.  Have you ever threatened anyone? [x]  No  []  Yes (Ask the questions listed below)   When and what happened?  Have you ever threatened someone with a gun, knife or other weapon? []  No  []  Yes - When and what happened? 2. Have you ever had an order of protection taken out against you? []  Yes []  No  3. Have you ever been arrested due to violence? []  Yes [x]  No  4. Have you ever been cruel to animals? []  Yes [x]  No    After consideration of C-SSRS screening results, C-SSRS assessments, and this professional's assessment the patient's overall suicide risk assessed to be:  [] No Risk  [] Low   [] Moderate   [x] High     [x] Discussed current suicide risk, protective and risk factors with RN and ED Physician     Disposition   [] Home:   [] Outpatient Provider:   [] Crisis Unit:   [x] Inpatient Psychiatric Unit: Pt is having SI with a plan to overdose.  [] Other:     SW will pursue inpatient admission for safety and stabilization.                        LINDA Stephen, [place]  07/13/22 7902

## 2022-07-13 NOTE — ED PROVIDER NOTES
previous alcohol use. She reports previous drug use. Family History: family history is not on file. . Unless otherwise noted, family history is non contributory    The patients home medications have been reviewed. Allergies: Patient has no known allergies. I have reviewed the past medical history, past surgical history, social history, and family history    ---------------------------------------------------PHYSICAL EXAM--------------------------------------    Constitutional/General: Alert and oriented x3, sitting up in bed in no acute distress  Head: Normocephalic and atraumatic  Eyes:  EOMI, sclera non icteric  ENT: Oropharynx clear, handling secretions, no trismus, no asymmetry of the posterior oropharynx or uvular edema  Neck: Supple, full ROM, no stridor, no meningeal signs  Respiratory: Lungs clear to auscultation bilaterally, no wheezes, rales, or rhonchi. Not in respiratory distress  Cardiovascular:  Regular rate. Regular rhythm. No murmurs, no gallops, no rubs. 2+ distal pulses. Equal extremity pulses. Gastrointestinal:  Abdomen Soft, Non tender, Non distended. No rebound, guarding, or rigidity. No pulsatile masses. Musculoskeletal: Moves all extremities x 4. Warm and well perfused, no clubbing, no cyanosis, no edema. Capillary refill <3 seconds  Skin: skin warm and dry. No rashes. Neurologic: GCS 15, no focal deficits, symmetric strength 5/5 in the upper and lower extremities bilaterally  Psychiatric: Normal Affect. Suicidal ideations. Denies homicidal ideations. Denies visual or auditory hallucinations.    -------------------------------------------------- RESULTS -------------------------------------------------  I have personally reviewed all laboratory and imaging results for this patient. Results are listed below.      LABS: (Lab results interpreted by me)  Results for orders placed or performed during the hospital encounter of 07/12/22   COVID-19, Rapid    Specimen: Nasopharyngeal Swab   Result Value Ref Range    SARS-CoV-2, NAAT Not Detected Not Detected   C.trachomatis N.gonorrhoeae DNA, Urine    Specimen: Urine   Result Value Ref Range    Source Urine    CBC with Auto Differential   Result Value Ref Range    WBC 4.6 4.5 - 11.5 E9/L    RBC 6.15 (H) 3.50 - 5.50 E12/L    Hemoglobin 11.9 11.5 - 15.5 g/dL    Hematocrit 38.5 34.0 - 48.0 %    MCV 62.6 (L) 80.0 - 99.9 fL    MCH 19.3 (L) 26.0 - 35.0 pg    MCHC 30.9 (L) 32.0 - 34.5 %    RDW 15.9 (H) 11.5 - 15.0 fL    Platelets 878 590 - 975 E9/L    MPV 10.9 7.0 - 12.0 fL    Neutrophils % 31.8 (L) 43.0 - 80.0 %    Lymphocytes % 46.9 (H) 20.0 - 42.0 %    Monocytes % 7.1 2.0 - 12.0 %    Eosinophils % 0.9 0.0 - 6.0 %    Basophils % 0.9 0.0 - 2.0 %    Neutrophils Absolute 1.52 (L) 1.80 - 7.30 E9/L    Lymphocytes Absolute 2.67 1.50 - 4.00 E9/L    Monocytes Absolute 0.32 0.10 - 0.95 E9/L    Eosinophils Absolute 0.04 (L) 0.05 - 0.50 E9/L    Basophils Absolute 0.04 0.00 - 0.20 E9/L    Atypical Lymphocytes Relative 11.5 (H) 0.0 - 4.0 %    Myelocyte Percent 0.9 0 - 0 %    Anisocytosis 1+     Polychromasia 1+     Ovalocytes 1+     Target Cells 1+    Comprehensive Metabolic Panel w/ Reflex to MG   Result Value Ref Range    Sodium 140 132 - 146 mmol/L    Potassium reflex Magnesium 3.2 (L) 3.5 - 5.0 mmol/L    Chloride 104 98 - 107 mmol/L    CO2 24 22 - 29 mmol/L    Anion Gap 12 7 - 16 mmol/L    Glucose 109 (H) 74 - 99 mg/dL    BUN 7 6 - 20 mg/dL    CREATININE 0.7 0.5 - 1.0 mg/dL    GFR Non-African American >60 >=60 mL/min/1.73    GFR African American >60     Calcium 9.2 8.6 - 10.2 mg/dL    Total Protein 7.4 6.4 - 8.3 g/dL    Albumin 3.9 3.5 - 5.2 g/dL    Total Bilirubin 0.5 0.0 - 1.2 mg/dL    Alkaline Phosphatase 115 (H) 35 - 104 U/L    ALT 27 0 - 32 U/L    AST 22 0 - 31 U/L   Urine Drug Screen   Result Value Ref Range    Amphetamine Screen, Urine NOT DETECTED Negative <1000 ng/mL    Barbiturate Screen, Ur NOT DETECTED Negative < 200 ng/mL    Benzodiazepine Screen, Urine NOT DETECTED Negative < 200 ng/mL    Cannabinoid Scrn, Ur POSITIVE (A) Negative < 50ng/mL    Cocaine Metabolite Screen, Urine POSITIVE (A) Negative < 300 ng/mL    Opiate Scrn, Ur NOT DETECTED Negative < 300ng/mL    PCP Screen, Urine NOT DETECTED Negative < 25 ng/mL    Methadone Screen, Urine NOT DETECTED Negative <300 ng/mL    Oxycodone Urine NOT DETECTED Negative <100 ng/mL    FENTANYL SCREEN, URINE NOT DETECTED Negative <1 ng/mL    Drug Screen Comment: see below    Serum Drug Screen   Result Value Ref Range    Ethanol Lvl <10 mg/dL    Acetaminophen Level <5.0 (L) 10.0 - 81.5 mcg/mL    Salicylate, Serum <9.5 0.0 - 30.0 mg/dL    TCA Scrn NEGATIVE Cutoff:300 ng/mL   Urinalysis   Result Value Ref Range    Color, UA Yellow Straw/Yellow    Clarity, UA SL CLOUDY Clear    Glucose, Ur Negative Negative mg/dL    Bilirubin Urine SMALL (A) Negative    Ketones, Urine TRACE (A) Negative mg/dL    Specific Gravity, UA >=1.030 1.005 - 1.030    Blood, Urine LARGE (A) Negative    pH, UA 6.0 5.0 - 9.0    Protein, UA TRACE Negative mg/dL    Urobilinogen, Urine 2.0 (A) <2.0 E.U./dL    Nitrite, Urine Negative Negative    Leukocyte Esterase, Urine Negative Negative   Microscopic Urinalysis   Result Value Ref Range    WBC, UA 0-1 0 - 5 /HPF    RBC, UA 5-10 (A) 0 - 2 /HPF    Bacteria, UA FEW (A) None Seen /HPF   Magnesium   Result Value Ref Range    Magnesium 2.0 1.6 - 2.6 mg/dL   POC Pregnancy Urine Qual   Result Value Ref Range    HCG, Urine, POC Negative Negative    Lot Number [id number]     Positive QC Pass/Fail Pass     Negative QC Pass/Fail Pass    EKG 12 Lead   Result Value Ref Range    Ventricular Rate 57 BPM    Atrial Rate 57 BPM    P-R Interval 138 ms    QRS Duration 92 ms    Q-T Interval 466 ms    QTc Calculation (Bazett) 453 ms    P Axis 40 degrees    R Axis 24 degrees    T Axis 44 degrees   ,       RADIOLOGY:  Interpreted by Radiologist unless otherwise specified  No orders to display         EKG Interpretation  Interpreted by emergency department physician, Dr. Corey Jaime    EKG: This EKG is signed and interpreted by me. Rate: 57  Rhythm: Sinus  Interpretation: Sinus bradycardia, normal axis, no acute ST elevations or depressions, intervals within normal limits, QTC is 453  Comparison: stable as compared to patient's most recent EKG       ------------------------- NURSING NOTES AND VITALS REVIEWED ---------------------------   The nursing notes within the ED encounter and vital signs as below have been reviewed by myself  BP (!) 153/97   Pulse 62   Temp 98.9 °F (37.2 °C)   Resp 19   SpO2 98%     Oxygen Saturation Interpretation: Normal    The patients available past medical records and past encounters were reviewed. ------------------------------ ED COURSE/MEDICAL DECISION MAKING----------------------  Medications   cefTRIAXone (ROCEPHIN) injection 500 mg (has no administration in time range)   doxycycline hyclate (VIBRAMYCIN) capsule 100 mg (has no administration in time range)   potassium bicarb-citric acid (EFFER-K) effervescent tablet 40 mEq (has no administration in time range)       I, Dr. Corey Jaime, am the primary provider of record    Medical Decision Making:   The patient is a 39-year-old female presents the emergency department complaining of suicidal ideations. Patient was pink slipped on arrival.  She is suicidal with a plan to walk into traffic or to overdose. Drug screen positive for cannabinoid and cocaine. Slightly hypokalemic and was given oral potassium replacement. No evidence of urinary tract infection. QTC is normal.  Patient medically cleared for inpatient psychiatric evaluation and treatment at this time. Oxygen Saturation Interpretation: 98 % on room air. Re-Evaluations:       Resting comfortably and in no distress.        This patient's ED course included: a personal history and physicial examination, re-evaluation prior to disposition, multiple bedside re-evaluations, IV medications, cardiac monitoring, continuous pulse oximetry and complex medical decision making and emergency management    This patient has remained hemodynamically stable during their ED course. Consultations:  Spoke with  (social work). Discussed case. They will provide consultation. Counseling: The emergency provider has spoken with the patient and discussed todays results, in addition to providing specific details for the plan of care and counseling regarding the diagnosis and prognosis. Questions are answered at this time and they are agreeable with the plan.       --------------------------------- IMPRESSION AND DISPOSITION ---------------------------------    IMPRESSION  1. Suicidal ideation        DISPOSITION  Disposition: Admit to mental health unit - medically cleared for admission  Patient condition is stable        NOTE: This report was transcribed using voice recognition software.  Every effort was made to ensure accuracy; however, inadvertent computerized transcription errors may be present       Yin Saxena DO  07/13/22 0028

## 2022-07-13 NOTE — ED NOTES
Pt referred to [facility] Emergency nurse Melissa Denton for review with on call re: possbile admission [address]. On-call psych requesting Ck level to be resulted.       [address], [place], [place]  07/13/22 7716

## 2022-07-13 NOTE — ED NOTES
Attempted to discuss case for admission with [facility] NP however pt has no CK level resulted awaiting results to further proceed with admission.       Angie Hummel RN  07/13/22 4945

## 2022-07-13 NOTE — ED NOTES
Belongings placed in locker [facility]  07/13/22 1020

## 2022-07-13 NOTE — PROGRESS NOTES
\" My father will take care of that, he does that for a living, finding rehab for people. ''  Reports she is too tired to answer any questions. States \"just put no for the next 15 questions\". Uncooperative and not answerring questions. 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 Repots she is homeless. [address] [facility]  Admission Note     Admission Type:   Admission Type:  Involuntary    Reason for admission:  Reason for Admission: \"becauseI relaped, life would be better ended, plan to run in traffic\"      Addictive Behavior:   Addictive Behavior  In the Past 3 Months, Have You Felt or Has Someone Told You That You Have a Problem With  : None    Medical Problems:   Past Medical History:   Diagnosis Date    Anxious depression     Fracture of right ankle     Hypertension        Status EXAM:  Mental Status and Behavioral Exam  Normal: No  Level of Assistance: Supervised  Facial Expression: Exaggerated,Hostile  Affect: Unstable  Level of Consciousness: Alert  Frequency of Checks: 4 times per hour, close  Mood:Normal: No  Mood: Depressed,Anxious,Labile,Angry,Despair,Worthless, low self-esteem,Sad,Irritable  Motor Activity:Normal: No  Motor Activity: Agitated  Eye Contact: Poor  Observed Behavior: Agitated,Hostile  Sexual Misconduct History: Current - no  Preception: [place] to person (refuses to cooperate)  Attention:Normal: No  Attention: Unable to concentrate  Thought Processes: Tangential  Thought Content:Normal: No  Thought Content: Compulsions,Delusions,Obsessions,Preoccupations  Depression Symptoms: Sleep disturbance,Impaired concentration,Feelings of worthlessness,Other (comment) (uncooperative with answerring)  Anxiety Symptoms: Generalized  Danielle Symptoms: Grandiosity,Labile,Poor judgment  Hallucinations: None  Delusions: No  Memory:Normal: No  Memory: Confabulation,Poor recent,Poor remote  Insight and Judgment: No  Insight and Judgment: Poor judgment,Poor insight,Unmotivated,Unrealistic    Tobacco Screening:  Practical Counseling, on admission, miller X, if applicable and completed (first 3 are required if patient doesn't refuse): ( x) Recognizing danger situations (included triggers and roadblocks)                    ( )x Coping skills (new ways to manage stress,relaxation techniques, changing routine, distraction)                                                           (x ) Basic information about quitting (benefits of quitting, techniques in how to quit, available resources  ( ) Referral for counseling faxed to Luc[facility]                                                                                                                   ( ) Patient refused counseling  ( ) Patient has not smoked in the last 30 days    Metabolic Screening:    No results found for: LABA1C    No results found for: CHOL  No results found for: TRIG  No results found for: HDL  No components found for: LDLCAL  No results found for: LABVLDL      Body mass index is 38.06 kg/m².     BP Readings from Last 2 Encounters:   07/13/22 126/87   04/25/22 (!) 150/103           Pt admitted with followings belongings:  Dental Appliances: Partials,Uppers  Vision - Corrective Lenses: Contact Lenses (just 1 -left eye)  Hearing Aid: None  Jewelry: Ring  Body Piercings Removed: N/A  Clothing: Undergarments,Footwear,Socks  Other Valuables: Cigarettes,Lighter/Matches    Henry Parra RN

## 2022-07-13 NOTE — ED NOTES
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  Will not elaborte on the unsafe enviorment or abuse     Misty Medina [facility]  07/12/22 0174

## 2022-07-13 NOTE — ED NOTES
Discussed case with Dr Serrano/ Mervat Qureshi, pt to be admitted to [address] [place] [address] Diagnosis Major Depressive Disorder; single episode. [id number] Please ensure that pts original pinkslip/ voluntary form accompanies pt soft chart.        Leda Candelario RN  07/13/22 0102

## 2022-07-13 NOTE — ED NOTES
Department of Emergency Medicine  FIRST PROVIDER TRIAGE NOTE             Independent MLP           7/12/22  9:01 PM EDT    Date of Encounter: 7/12/22   MRN: [id number]      HPI: Seble Concepcion is a 39 y.o. female who presents to the ED for No chief complaint on file. suicidal ideation     ROS: Negative for fever or cough. PE: Gen Appearance/Constitutional: alert  CV: regular rate  Pulm: CTA bilat     Initial Plan of Care: All treatment areas with department are currently occupied. Plan to order/Initiate the following while awaiting opening in ED: labs and EKG.   Initiate Treatment-Testing, Proceed toTreatment Area When Bed Available for ED Attending/MLP to Continue Care    Electronically signed by DEE Zarate   DD: 7/12/22       DEE Zarate  07/13/22 1107

## 2022-07-14 PROBLEM — F19.10 POLYSUBSTANCE ABUSE (HCC): Status: ACTIVE | Noted: 2022-07-14

## 2022-07-14 PROBLEM — F31.81 MODERATE DEPRESSED BIPOLAR II DISORDER (HCC): Status: ACTIVE | Noted: 2022-07-14

## 2022-07-14 LAB
ANION GAP SERPL CALCULATED.3IONS-SCNC: 16 MMOL/L (ref 7–16)
BUN BLDV-MCNC: 11 MG/DL (ref 6–20)
CALCIUM SERPL-MCNC: 8.9 MG/DL (ref 8.6–10.2)
CHLORIDE BLD-SCNC: 102 MMOL/L (ref 98–107)
CO2: 21 MMOL/L (ref 22–29)
CREAT SERPL-MCNC: 0.8 MG/DL (ref 0.5–1)
GFR AFRICAN AMERICAN: >60
GFR NON-AFRICAN AMERICAN: >60 ML/MIN/1.73
GLUCOSE BLD-MCNC: 133 MG/DL (ref 74–99)
POTASSIUM SERPL-SCNC: 3.8 MMOL/L (ref 3.5–5)
SODIUM BLD-SCNC: 139 MMOL/L (ref 132–146)

## 2022-07-14 PROCEDURE — 36415 COLL VENOUS BLD VENIPUNCTURE: CPT

## 2022-07-14 PROCEDURE — 80048 BASIC METABOLIC PNL TOTAL CA: CPT

## 2022-07-14 PROCEDURE — 90792 PSYCH DIAG EVAL W/MED SRVCS: CPT | Performed by: NURSE PRACTITIONER

## 2022-07-14 PROCEDURE — 6370000000 HC RX 637 (ALT 250 FOR IP): Performed by: PSYCHIATRY & NEUROLOGY

## 2022-07-14 PROCEDURE — 6370000000 HC RX 637 (ALT 250 FOR IP): Performed by: NURSE PRACTITIONER

## 2022-07-14 PROCEDURE — 1240000000 HC EMOTIONAL WELLNESS R&B

## 2022-07-14 RX ORDER — CHLORDIAZEPOXIDE HYDROCHLORIDE 25 MG/1
25 CAPSULE, GELATIN COATED ORAL EVERY 6 HOURS
Status: DISCONTINUED | OUTPATIENT
Start: 2022-07-14 | End: 2022-07-15

## 2022-07-14 RX ADMIN — FOLIC ACID 1 MG: 1 TABLET ORAL at 09:41

## 2022-07-14 RX ADMIN — CHLORDIAZEPOXIDE HYDROCHLORIDE 25 MG: 25 CAPSULE ORAL at 17:01

## 2022-07-14 RX ADMIN — CHLORDIAZEPOXIDE HYDROCHLORIDE 25 MG: 25 CAPSULE ORAL at 11:11

## 2022-07-14 RX ADMIN — MELATONIN 3 MG ORAL TABLET 3 MG: 3 TABLET ORAL at 20:40

## 2022-07-14 RX ADMIN — DIVALPROEX SODIUM 500 MG: 500 TABLET, EXTENDED RELEASE ORAL at 20:40

## 2022-07-14 RX ADMIN — DIVALPROEX SODIUM 500 MG: 500 TABLET, EXTENDED RELEASE ORAL at 09:42

## 2022-07-14 RX ADMIN — CHLORDIAZEPOXIDE HYDROCHLORIDE 25 MG: 25 CAPSULE ORAL at 03:12

## 2022-07-14 RX ADMIN — HYDROXYZINE PAMOATE 50 MG: 50 CAPSULE ORAL at 22:21

## 2022-07-14 RX ADMIN — MULTIVITAMIN TABLET 1 TABLET: TABLET at 09:41

## 2022-07-14 RX ADMIN — CHLORDIAZEPOXIDE HYDROCHLORIDE 25 MG: 25 CAPSULE ORAL at 22:21

## 2022-07-14 RX ADMIN — Medication 100 MG: at 09:41

## 2022-07-14 ASSESSMENT — SLEEP AND FATIGUE QUESTIONNAIRES
SLEEP PATTERN: DISTURBED/INTERRUPTED SLEEP;DIFFICULTY FALLING ASLEEP;NIGHTMARES/TERRORS
DO YOU HAVE DIFFICULTY SLEEPING: YES
AVERAGE NUMBER OF SLEEP HOURS: 12
DO YOU USE A SLEEP AID: NO

## 2022-07-14 ASSESSMENT — LIFESTYLE VARIABLES
HOW OFTEN DO YOU HAVE A DRINK CONTAINING ALCOHOL: 4 OR MORE TIMES A WEEK
HOW MANY STANDARD DRINKS CONTAINING ALCOHOL DO YOU HAVE ON A TYPICAL DAY: 3 OR 4

## 2022-07-14 ASSESSMENT — PAIN SCALES - GENERAL: PAINLEVEL_OUTOF10: 0

## 2022-07-14 ASSESSMENT — PATIENT HEALTH QUESTIONNAIRE - PHQ9: SUM OF ALL RESPONSES TO PHQ QUESTIONS 1-9: 27

## 2022-07-14 NOTE — PROGRESS NOTES
Pt. has been resting in room most of shift between meals and is comfortable with no complaints. Pt. reports fleeting suicidal ideations with no plan or intent. Pt. denies  homicidal ideations and hallucinations with no voiced delusions. Pt. Is medication compliant and groups encouraged. Pt. Has been in control with no unit problems.

## 2022-07-14 NOTE — H&P
Department of Psychiatry  History and Physical - Adult     CHIEF COMPLAINT:  \" I felt like I wanted to kill myself. \"    Patient was seen after discussing with the treatment team and reviewing the chart    CIRCUMSTANCES OF ADMISSION: presented to the ED reporting suicidal ideations with a plan to overdose on her medications or substances or run into traffic. HISTORY OF PRESENT ILLNESS:      The patient is a 39 y.o. female with significant past history of hypertension presented to the ED reporting suicidal ideations with a plan to overdose on her medications or substances or run into traffic. Patient reports that she has been drinking alcohol 2-3 times per week or plan Allegra reports that just 3 DUIs in the past was on house arrest.  Patient was that she is been homeless selling herself on the street she also reported a recent suicide attempt of trying take her medications to end her life. In the ED her urine drug screen is positive for cannabis and cocaine her blood alcohol level is negative however she endorses drinking large quantities of alcohol she was medically clear admitted 7 SE. adult psychiatric unit for further psychiatric assessment stabilization and treatment. Upon evaluation patient is very tired and she has trouble staying awake during the interview. She endorses significant depressive symptoms reports that she is having trouble sleeping, she reports a recent weight loss of 40 pounds she endorses anhedonia and states that she has lost interest in things she used to enjoy she feels hopeless helpless and worthless. Her affect is flat and blunted. She states that she has been diagnosed bipolar disorder and that her last manic episode was just prior to her admission here and then she fell into a depression. Currently she endorses her depressive symptoms are the most distressing to her.           Past psychiatric history: Patient reports 3 past inpatient psychiatric hospitalizations in [place] in the past.  She is not currently active in any outpatient treatment. States she been diagnosed with bipolar disorder in the past she endorses a history of multiple suicide attempts in the past    Substance history: Patient endorses a substantial history of substance abuse she states that she had been sober for 7 months and then relapsed approximately a month and a half ago she has been to rehab in the past    Legal history: Patient history of 3 DUIs in the past and states that she is been on house arrest in the past urine drug screen is positive for cannabis and cocaine reports drinking 2-3 drinks of liquor a day reports smoking crack cocaine all day      Personal family and social history patient is currently homeless 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 She did graduate from high school and completed some college. Currently her only support is her father. She has 2 children ages 12 and 25 but she does not have custody with them and does not see them or spend time with them. Past Medical History:        Diagnosis Date    Anxious depression     Fracture of right ankle     Hypertension        Medications Prior to Admission:   Medications Prior to Admission: pantoprazole (PROTONIX) 40 MG tablet, Take 1 tablet by mouth every morning (before breakfast)  aspirin 325 MG EC tablet, Take 1 tablet by mouth daily  prazosin (MINIPRESS) 1 MG capsule, Take 1 mg by mouth nightly  QUEtiapine (SEROQUEL) 200 MG tablet, Take 400 mg by mouth at bedtime   doxepin (SINEQUAN) 25 MG capsule, take 1 capsule by mouth at bedtime    Past Surgical History:        Procedure Laterality Date    ANKLE FRACTURE SURGERY Right 2/23/2022    RIGHT ANKLE OPEN REDUCTION INTERNAL FIXATION performed by Devon Presley MD at [address]      OVARIAN CYST REMOVAL         Allergies:   Patient has no known allergies.     Family History  Family of any auditory visual hallucinations delusions or other perceptual normalities. Reports she was feeling suicidal denies homicidal ideations intent or plan cognition:  oriented to person, place, and time   Concentration intact  Insight fair   Judgement fair     DIAGNOSIS:  Bipolar 2 depressed  Polysubstance abuse        LABS: REVIEWED TODAY:  Recent Labs     07/12/22 2115   WBC 4.6   HGB 11.9        Recent Labs     07/12/22 2115      K 3.2*      CO2 24   BUN 7   CREATININE 0.7   GLUCOSE 109*     Recent Labs     07/12/22 2115   BILITOT 0.5   ALKPHOS 115*   AST 22   ALT 27     Lab Results   Component Value Date/Time    LABAMPH NOT DETECTED 07/12/2022 09:15 PM    BARBSCNU NOT DETECTED 07/12/2022 09:15 PM    LABBENZ NOT DETECTED 07/12/2022 09:15 PM    LABMETH NOT DETECTED 07/12/2022 09:15 PM    OPIATESCREENURINE NOT DETECTED 07/12/2022 09:15 PM    PHENCYCLIDINESCREENURINE NOT DETECTED 07/12/2022 09:15 PM    ETOH <10 07/12/2022 09:15 PM     No results found for: TSH, FREET4  No results found for: LITHIUM  No results found for: VALPROATE, CBMZ  No results found for: LITHIUM, VALPROATE      Radiology No results found. TREATMENT PLAN:    Risk Management: Based on the diagnosis and assessment biopsychosocial treatment model was presented to the patient and was given the opportunity to ask any question. The patient was agreeable to the plan and all the patient's questions were answered to the patient's satisfaction. I discussed with the patient the risk, benefit, alternative and common side effects for the proposed medication treatment. The patient is consenting to this treatment. Collateral Information:  Will obtain collateral information from the family or friends. Will obtain medical records as appropriate from out patient providers  Will consult the hospitalist for a physical exam to rule out any co-morbid physical condition.     Home medication Reconciled       Continue Depakote  mg twice daily  Continue Librium 25 mg every 6 hours for withdrawal      Prn Haldol 5mg and Vistaril 50mg q6hr for extreme agitation. Trazodone as ordered for insomnia  Vistaril as ordered for anxiety      Psychotherapy:   Encourage participation in milieu and group therapy  Individual therapy as needed      Can discontinue constant observer. Patient is deemed to be moderate risk for suicide based on productive risk factors as well as risk mitigation          Behavioral Services  Medicare Certification Upon Admission    I certify that this patient's inpatient psychiatric hospital admission is medically necessary for:    [x] (1) Treatment which could reasonably be expected to improve this patient's condition,       [x] (2) Or for diagnostic study;     AND     [x](2) The inpatient psychiatric services are provided while the individual is under the care of a physician and are included in the individualized plan of care.     Estimated length of stay/service outpatient psychiatric and counseling services    Plan for post-hospital care 3 to 7 days based on stability      Electronically signed by MACHO Fuentes CNP on 7/58/4357 at 8:21 AM

## 2022-07-14 NOTE — PROGRESS NOTES
Leisure assessment incomplete. Observed in bed sleeping upon all attempts ( 0930, 1145, 1400). Has not been up to any groups currently, but will continue to complete as improves.

## 2022-07-14 NOTE — CARE COORDINATION
ARNOLDO contacted pt dad Antonella De Los Santos  (HEIDI signed). Antonella De Los Santos reports pt recently relapsed. He reports a year ago pt boyfriend  and it all became too much for pt so he brought her to [facility] to be with him. Antonella De Los Santos reports before she came here, pt was admitted to psychiatric unit in [place]. From there pt was sent to [facility], then to [place] [id number], and then to the [facility]. Antonella De Los Santos reports pt was doing well while at the [facility]. Antonella De Los Santos reports pt had money from unemployment that she was going to receive and he told her to give it to him so he could give it to pt sparingly. Antonella De Los Santos reports once pt got the money, she left the [facility], and he didn't see her for 3 months. 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 Antonella De Los Santos reports when he did talk with pt she told him she wasn't going to make it to her next birthday and she was already dead. Antonella De Los Santos was concerned that pt was going to use too much drugs or use fentanyl to try and harm herself. Antonella De Los Santos reports at that time he had pt pink slipped. Antonella De Los Santos reports pt needs to go back to treatment at time of discharge. He feels pt needs to go to [facility] to get away from the crowd of people here. Antonella De Los Santos reports pt also has not been taking her medications for about 3-4 months. He reports if pt discharges to the streets that will be a suicide mission for her. Antonella De Los Santos is unsure if pt has access to any guns or weapons but reports she can easily get one from [facility] of the dope boys. \" Antonella De Los Santos expressed concerns with pt mental health, her lack of self esteem, her lack of self worth, and her substance use. Antonella De Los Santos is requesting to be updated on pt discharge date and plan.

## 2022-07-14 NOTE — PLAN OF CARE
Problem: Anxiety  Goal: Will report anxiety at manageable levels  Description: INTERVENTIONS:  1. Administer medication as ordered  2. Teach and rehearse alternative coping skills  3. Provide emotional support with 1:1 interaction with staff  Outcome: Progressing          Out in lounge social and watching TV. Guarded and avoidant with staff. Offers little to no information. Napping at intervals. Denies hallucinations. Reports thoughts of suicide, denies plan or intent. Taking po foods and fluids well.
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
## 2022-07-14 NOTE — PROGRESS NOTES
Observed in bed still sleeping. Does not answer when name called out, nor acknowledged encouragement. Will continue to complete as more alert.

## 2022-07-14 NOTE — PLAN OF CARE
[address] [place] [address]  Initial Interdisciplinary Treatment Plan NOTE    Review Date & Time: 7/14/22 0900    Patient was in treatment team    Admission Type:   Admission Type:  Involuntary    Reason for admission:  Reason for Admission: \"becauseI relaped, life would be better ended, plan to run in traffic\"      Estimated Length of Stay Update:  5 days  Estimated Discharge Date Update:  MONDAY    EDUCATION:   Learner Progress Toward Treatment Goals: Reviewed results and recommendations of this team    Method: Individual    Outcome: Verbalized understanding and Needs reinforcement    PATIENT GOALS: \"GET SOME REST\"    PLAN/TREATMENT RECOMMENDATIONS UPDATE: CIWA/MEDICATIONS FOR DETOX, SUICIDE PRECAUTIONS, SUPPORTIVE CARE, GROUPS AND MEDICATIONS, DISCHARGE PLANNING AND FOLLOW UP    GOALS UPDATE:   Time frame for Short-Term Goals:  5 DAYS    Therese Melendez RN

## 2022-07-14 NOTE — PLAN OF CARE
Problem: Depression/Self Harm  Goal: Effect of psychiatric condition will be minimized and patient will be protected from self harm  Description: INTERVENTIONS:  1. Assess impact of patient's symptoms on level of functioning, self care needs and offer support as indicated  2. Assess patient/family knowledge of depression, impact on illness and need for teaching  3. Provide emotional support, presence and reassurance  4. Assess for possible suicidal thoughts or ideation. If patient expresses suicidal thoughts or statements do not leave alone, initiate Suicide Precautions, move to a room close to the nursing station and obtain sitter  5. Initiate consults as appropriate with Mental Health Professional, Spiritual Care, Psychosocial CNS, and consider a recommendation to the LIP for a Psychiatric Consultation  Outcome: Progressing  NO SELF HARM. PT.REPORTS FLEETING SUICIDAL IDEATIONS WITH NO PLAN OR INTENT, CONTRACTS FOR SAFETY. Problem: Drug Abuse/Detox  Goal: Will have no detox symptoms and will verbalize plan for changing drug-related behavior  Description: INTERVENTIONS:  1. Administer medication as ordered  2. Monitor physical status  3. Provide emotional support with 1:1 interaction with staff  4. Encourage  recovery focused treatment   Outcome: Progressing  NO OBSERVED OR REPORTED WITHDRAWAL SYMPTOMS AT PRESENT.

## 2022-07-15 PROCEDURE — 6370000000 HC RX 637 (ALT 250 FOR IP)

## 2022-07-15 PROCEDURE — 6370000000 HC RX 637 (ALT 250 FOR IP): Performed by: NURSE PRACTITIONER

## 2022-07-15 PROCEDURE — 1240000000 HC EMOTIONAL WELLNESS R&B

## 2022-07-15 PROCEDURE — 6370000000 HC RX 637 (ALT 250 FOR IP): Performed by: PSYCHIATRY & NEUROLOGY

## 2022-07-15 RX ORDER — CHLORDIAZEPOXIDE HYDROCHLORIDE 25 MG/1
25 CAPSULE, GELATIN COATED ORAL EVERY 8 HOURS
Status: DISCONTINUED | OUTPATIENT
Start: 2022-07-15 | End: 2022-07-16

## 2022-07-15 RX ADMIN — DIVALPROEX SODIUM 500 MG: 500 TABLET, EXTENDED RELEASE ORAL at 22:11

## 2022-07-15 RX ADMIN — CHLORDIAZEPOXIDE HYDROCHLORIDE 25 MG: 25 CAPSULE ORAL at 19:15

## 2022-07-15 RX ADMIN — DIVALPROEX SODIUM 500 MG: 500 TABLET, EXTENDED RELEASE ORAL at 09:50

## 2022-07-15 RX ADMIN — CHLORDIAZEPOXIDE HYDROCHLORIDE 25 MG: 25 CAPSULE ORAL at 05:30

## 2022-07-15 RX ADMIN — MELATONIN 3 MG ORAL TABLET 3 MG: 3 TABLET ORAL at 22:11

## 2022-07-15 RX ADMIN — MULTIVITAMIN TABLET 1 TABLET: TABLET at 09:50

## 2022-07-15 RX ADMIN — CHLORDIAZEPOXIDE HYDROCHLORIDE 25 MG: 25 CAPSULE ORAL at 11:06

## 2022-07-15 RX ADMIN — Medication 100 MG: at 09:50

## 2022-07-15 RX ADMIN — HYDROXYZINE PAMOATE 50 MG: 50 CAPSULE ORAL at 22:13

## 2022-07-15 RX ADMIN — FOLIC ACID 1 MG: 1 TABLET ORAL at 09:50

## 2022-07-15 ASSESSMENT — PAIN SCALES - GENERAL
PAINLEVEL_OUTOF10: 0
PAINLEVEL_OUTOF10: 0

## 2022-07-15 NOTE — PROGRESS NOTES
per week    Drug use: Not Currently     Frequency: 7.0 times per week     Types: Cocaine     Comment: \"all day every day\"    Sexual activity: Yes     Partners: Female, Male   Other Topics Concern    Not on file   Social History Narrative    Not on file     Social Determinants of Health     Financial Resource Strain: Not on file   Food Insecurity: Not on file   Transportation Needs: Not on file   Physical Activity: Not on file   Stress: Not on file   Social Connections: Not on file   Intimate Partner Violence: Not on file   Housing Stability: Not on file           ROS:  [x] All negative/unchanged except if checked.  Explain positive(checked items) below:  [] Constitutional  [] Eyes  [] Ear/Nose/Mouth/Throat  [] Respiratory  [] CV  [] GI  []   [] Musculoskeletal  [] Skin/Breast  [] Neurological  [] Endocrine  [] Heme/Lymph  [] Allergic/Immunologic    Explanation:     MEDICATIONS:    Current Facility-Administered Medications:     chlordiazePOXIDE (LIBRIUM) capsule 25 mg, 25 mg, Oral, E5K, Emerita B Dellick, APRN - CNP, 25 mg at 07/15/22 1106    acetaminophen (TYLENOL) tablet 650 mg, 650 mg, Oral, Q6H PRN, Andry Huntley MD    magnesium hydroxide (MILK OF MAGNESIA) 400 MG/5ML suspension 30 mL, 30 mL, Oral, Daily PRN, Andry Huntley MD    nicotine (NICODERM CQ) 21 MG/24HR 1 patch, 1 patch, TransDERmal, Daily, Andry Huntley MD, 1 patch at 07/15/22 0950    aluminum & magnesium hydroxide-simethicone (MAALOX) 200-200-20 MG/5ML suspension 30 mL, 30 mL, Oral, PRN, Andry Huntley MD    hydrOXYzine pamoate (VISTARIL) capsule 50 mg, 50 mg, Oral, TID PRN, Andry Huntley MD, 50 mg at 07/14/22 2221    haloperidol (HALDOL) tablet 5 mg, 5 mg, Oral, Q6H PRN **OR** haloperidol lactate (HALDOL) injection 5 mg, 5 mg, IntraMUSCular, Q6H PRN, Andry Huntley MD    melatonin tablet 3 mg, 3 mg, Oral, Nightly, Andry Huntley MD, 3 mg at 07/14/22 2040    divalproex (DEPAKOTE ER) extended release tablet 500 mg, 500 mg, Oral, BID, Angie Saldaña MD, 500 mg at 07/15/22 9563    thiamine mononitrate tablet 100 mg, 100 mg, Oral, Daily, Angie Saldaña MD, 100 mg at 07/15/22 0950    multivitamin 1 tablet, 1 tablet, Oral, Daily, Angie Saldaña MD, 1 tablet at 25/45/76 8941    folic acid (FOLVITE) tablet 1 mg, 1 mg, Oral, Daily, Angie Saldaña MD, 1 mg at 07/15/22 0950    cefTRIAXone (ROCEPHIN) injection 500 mg, 500 mg, IntraMUSCular, Once, DEE Rollins      Examination:  /85   Pulse 73   Temp 97.6 °F (36.4 °C) (Oral)   Resp 14   Ht 5' 7\" (1.702 m)   Wt 243 lb (110.2 kg)   LMP  (LMP Unknown)   SpO2 96%   Breastfeeding No Comment: constant Bleeding, suppose to have procedure for HPV  BMI 38.06 kg/m²   Gait - steady  Medication side effects(SE): drowsy    Mental Status Examination:    Level of consciousness:  within normal limits   Appearance:  poor grooming and poor hygiene  Behavior/Motor:  no abnormalities noted  Attitude toward examiner:  evasive, guarded, and withdrawn  Speech:  spontaneous, normal rate, and normal volume   Mood: \"tired\"  Affect:  flat, constricted   Thought processes:  linear without flight of ideas or loose associations   Thought content:  somatic   Cognition:  oriented to person, place, and time   Concentration poor  Insight poor   Judgement poor     ASSESSMENT:   Patient symptoms are:  [] Well controlled  [] Improving  [] Worsening   No change      Diagnosis:   Active Problems:    Polysubstance abuse (HCC)    Moderate depressed [facility] ([facility]  Resolved Problems:    * No resolved hospital problems.  *      LABS:    Recent Labs     07/12/22 2115   WBC 4.6   HGB 11.9        Recent Labs     07/12/22 2115 07/14/22  1212    139   K 3.2* 3.8    102   CO2 24 21*   BUN 7 11   CREATININE 0.7 0.8   GLUCOSE 109* 133*     Recent Labs     07/12/22 2115   BILITOT 0.5   ALKPHOS 115*   AST 22   ALT 27     Lab Results   Component Value Date/Time    LABAMPH NOT DETECTED 07/12/2022 09:15 PM    BARBSCNU NOT DETECTED 07/12/2022 09:15 PM    LABBENZ NOT DETECTED 07/12/2022 09:15 PM    LABMETH NOT DETECTED 07/12/2022 09:15 PM    OPIATESCREENURINE NOT DETECTED 07/12/2022 09:15 PM    PHENCYCLIDINESCREENURINE NOT DETECTED 07/12/2022 09:15 PM    ETOH <10 07/12/2022 09:15 PM     No results found for: TSH, FREET4  No results found for: LITHIUM  No results found for: VALPROATE, CBMZ    RISK ASSESSMENT    Treatment Plan:  Reviewed current Medications with the patient. Risks, benefits, side effects, drug-to-drug interactions and alternatives to treatment were discussed. Collateral information  CD evaluation  Encourage patient to attend group and other milieu activities. Discharge planning discussed with the patient and treatment team.    Medications  Continue Depakote  mg twice daily  Continue Librium 25 mg every 8 hours for withdrawal        Prn Haldol 5mg and Vistaril 50mg q6hr for extreme agitation. Trazodone as ordered for insomnia  Vistaril as ordered for anxiety       PSYCHOTHERAPY/COUNSELING:  [x] Therapeutic interview  [x] Supportive  [] CBT  [] Ongoing  [] Other    [x] Patient continues to need, on a daily basis, active treatment furnished directly by or requiring the supervision of inpatient psychiatric personnel        Behavioral Services  Medicare Certification Upon Admission    I certify that this patient's inpatient psychiatric hospital admission is medically necessary for:    [x] (1) Treatment which could reasonably be expected to improve this patient's condition,       [x] (2) Or for diagnostic study;     AND     [x](2) The inpatient psychiatric services are provided while the individual is under the care of a physician and are included in the individualized plan of care.     Estimated length of stay/service 3-7 days    Plan for post-hospital care follow up with outpatient provider    Electronically signed by MACHO English CNP on 7/15/2022 at 2:27 PM

## 2022-07-15 NOTE — BH NOTE
Pt is stable at this time. When asked about suicidal thoughts. .the patient smiled. ..then paused and said, \"If I say yes will I have to stay longer? \" Pt then paused briefly and said, \"No\" with a smirk on her face. Affect appears to be incongruent. Pt denies homicidal ideations. Pt denies hallucinations. Pt is cooperative and social with peers. Will follow and monitor.

## 2022-07-15 NOTE — PLAN OF CARE
Problem: Anxiety  Goal: Will report anxiety at manageable levels  Description: INTERVENTIONS:  1. Administer medication as ordered  2. Teach and rehearse alternative coping skills  3. Provide emotional support with 1:1 interaction with staff  7/15/2022 1004 by Moreno Mckeon RN  Outcome: Progressing  7/15/2022 0415 by Winter Yan RN  Outcome: Progressing   PT. RESTING IN ROOM, AWAKENED FOR TREATMENT TEAM, PT. DECLINED TO ATTEND. Problem: Coping  Goal: Pt/Family able to verbalize concerns and demonstrate effective coping strategies  Description: INTERVENTIONS:  1. Assist patient/family to identify coping skills, available support systems and cultural and spiritual values  2. Provide emotional support, including active listening and acknowledgement of concerns of patient and caregivers  3. Reduce environmental stimuli, as able  4. Instruct patient/family in relaxation techniques, as appropriate  5. Assess for spiritual pain/suffering and initiate Spiritual Care, Psychosocial Clinical Specialist consults as needed  7/15/2022 1004 by Moreno Mckeon RN  Outcome: Not Progressing  7/15/2022 0415 by Winter Yan RN  Outcome: Progressing   PT. HAS BEEN MEDICATION COMPLAINT, BUT NEEDS ENCOURAGEMENT TO ATTEND GROUPS. Problem: Decision Making  Goal: Pt/Family able to effectively weigh alternatives and participate in decision making related to treatment and care  Description: INTERVENTIONS:  1. Determine when there are differences between patient's view, family's view, and healthcare provider's view of condition  2. Facilitate patient and family articulation of goals for care  3. Help patient and family identify pros/cons of alternative solutions  4. Provide information as requested by patient/family  5. Respect patient/family right to receive or not to receive information  6. Serve as a liaison between patient and family and health care team  7.  Initiate Consults from Ethics, Palliative Care or initiate Family Care Conference as is appropriate  7/15/2022 0415 by Meenakshi Quigley RN  Outcome: Progressing  PT. DECLINED TO ATTEND TREATMENT TEAM.      Problem: Depression/Self Harm  Goal: Effect of psychiatric condition will be minimized and patient will be protected from self harm  Description: INTERVENTIONS:  1. Assess impact of patient's symptoms on level of functioning, self care needs and offer support as indicated  2. Assess patient/family knowledge of depression, impact on illness and need for teaching  3. Provide emotional support, presence and reassurance  4. Assess for possible suicidal thoughts or ideation. If patient expresses suicidal thoughts or statements do not leave alone, initiate Suicide Precautions, move to a room close to the nursing station and obtain sitter  5. Initiate consults as appropriate with Mental Health Professional, Spiritual Care, Psychosocial CNS, and consider a recommendation to the [facility] for a Psychiatric Consultation  7/15/2022 1004 by Simone Woods RN  Outcome: Progressing  7/15/2022 0415 by Meenakshi Quigley RN  Outcome: Progressing  NO SELF HARM. PT. DENIES SUICIDAL IDEATIONS.        [address] [place] [address]  Day 3 Interdisciplinary Treatment Plan NOTE    Review Date & Time:  7/15/22  1000    Patient was not in treatment team    Estimated Length of Stay Update:   5 DAYS  Estimated Discharge Date Update:  MONDAY    EDUCATION:   Learner Progress Toward Treatment Goals: Reviewed results and recommendations of this team    Method: Individual    Outcome: Needs reinforcement    PATIENT GOALS: NONE REPORTED    PLAN/TREATMENT RECOMMENDATIONS UPDATE: CONTINUE MEDICATIONS AND ASSESS FOR SUICIDE AND SELF HARM RISK, ASSESS FOR DETOX SYMPTOMS/CIWA, GROUPS AND SUPPORTIVE CARE, AOD REFERRAL, DISCHARGE PLANNING AND FOLLOW UP    GOALS UPDATE:   Time frame for Short-Term Goals:  5 DAYS      Simone Woods RN

## 2022-07-16 PROCEDURE — 6370000000 HC RX 637 (ALT 250 FOR IP): Performed by: PSYCHIATRY & NEUROLOGY

## 2022-07-16 PROCEDURE — 6370000000 HC RX 637 (ALT 250 FOR IP): Performed by: NURSE PRACTITIONER

## 2022-07-16 PROCEDURE — 1240000000 HC EMOTIONAL WELLNESS R&B

## 2022-07-16 PROCEDURE — 99232 SBSQ HOSP IP/OBS MODERATE 35: CPT | Performed by: NURSE PRACTITIONER

## 2022-07-16 PROCEDURE — 6370000000 HC RX 637 (ALT 250 FOR IP)

## 2022-07-16 RX ORDER — DIVALPROEX SODIUM 500 MG/1
500 TABLET, DELAYED RELEASE ORAL EVERY 12 HOURS SCHEDULED
Status: DISCONTINUED | OUTPATIENT
Start: 2022-07-16 | End: 2022-07-19 | Stop reason: HOSPADM

## 2022-07-16 RX ORDER — CHLORDIAZEPOXIDE HYDROCHLORIDE 25 MG/1
25 CAPSULE, GELATIN COATED ORAL EVERY 8 HOURS PRN
Status: DISCONTINUED | OUTPATIENT
Start: 2022-07-16 | End: 2022-07-19 | Stop reason: HOSPADM

## 2022-07-16 RX ADMIN — FOLIC ACID 1 MG: 1 TABLET ORAL at 10:09

## 2022-07-16 RX ADMIN — MELATONIN 3 MG ORAL TABLET 3 MG: 3 TABLET ORAL at 22:14

## 2022-07-16 RX ADMIN — DIVALPROEX SODIUM 500 MG: 500 TABLET, DELAYED RELEASE ORAL at 22:14

## 2022-07-16 RX ADMIN — DIVALPROEX SODIUM 500 MG: 500 TABLET, EXTENDED RELEASE ORAL at 09:00

## 2022-07-16 RX ADMIN — HYDROXYZINE PAMOATE 50 MG: 50 CAPSULE ORAL at 16:41

## 2022-07-16 RX ADMIN — CHLORDIAZEPOXIDE HYDROCHLORIDE 25 MG: 25 CAPSULE ORAL at 16:41

## 2022-07-16 RX ADMIN — DIVALPROEX SODIUM 500 MG: 500 TABLET, DELAYED RELEASE ORAL at 10:11

## 2022-07-16 RX ADMIN — Medication 100 MG: at 10:09

## 2022-07-16 RX ADMIN — MULTIVITAMIN TABLET 1 TABLET: TABLET at 10:09

## 2022-07-16 RX ADMIN — CHLORDIAZEPOXIDE HYDROCHLORIDE 25 MG: 25 CAPSULE ORAL at 02:52

## 2022-07-16 ASSESSMENT — PAIN SCALES - GENERAL
PAINLEVEL_OUTOF10: 0

## 2022-07-16 NOTE — PROGRESS NOTES
Pt alert, resting in her bed. Pt denies SI, HI, and AVH. States she is \"ok\". Pt was not very talkative but not quite dismissive. Denies any needs or issues with her meds. She did come out on the unit later for a while and was social with select peers.  Will continue to monitor

## 2022-07-16 NOTE — PROGRESS NOTES
BEHAVIORAL HEALTH FOLLOW-UP NOTE     7/16/2022     Patient was seen and examined in person, Chart reviewed   Patient's case discussed with staff/team    Chief Complaint: \"I'm doing okay\"    Interim History:   Patient seen in her room she states she feels tired. She complains of some feelings of restless leg. Staff emergency well visit to she denies SI/HI intent or plan denies auditory or visual hallucinations she has been out of a bit more visible in the unit watching TV but staff reports she remains isolate herself dismissive at times. Not offering much conversation          Appetite:   [x] Normal/Unchanged  [] Increased  [] Decreased      Sleep:       [] Normal/Unchanged  [] Fair       [x] Poor              Energy:    [x] Normal/Unchanged  [] Increased  [] Decreased        SI [] Present  [x] Absent    HI  []Present  [x] Absent     Aggression:  [] yes  [x] no    Patient is [] able  [x] unable to CONTRACT FOR SAFETY     PAST MEDICAL/PSYCHIATRIC HISTORY:   Past Medical History:   Diagnosis Date    Anxious depression     Fracture of right ankle     Hypertension        FAMILY/SOCIAL HISTORY:  Family History   Problem Relation Age of Onset    Substance Abuse Mother     Substance Abuse Father     Substance Abuse Sister      Social History     Socioeconomic History    Marital status: Single     Spouse name: Not on file    Number of children: 2    Years of education: 12+2    Highest education level: Not on file   Occupational History     Employer: UNEMPLOYED     Comment: no income   Tobacco Use    Smoking status: Every Day     Packs/day: 1.00     Years: 10.00     Pack years: 10.00     Types: Cigarettes    Smokeless tobacco: Never   Vaping Use    Vaping Use: Some days    Substances: Nicotine    Devices: Pre-filled pod   Substance and Sexual Activity    Alcohol use:  Yes     Alcohol/week: 15.0 standard drinks     Types: 3 Cans of beer, 4 Shots of liquor, 8 Glasses of wine per week    Drug use: Not Currently Frequency: 7.0 times per week     Types: Cocaine     Comment: \"all day every day\"    Sexual activity: Yes     Partners: Female, Male   Other Topics Concern    Not on file   Social History Narrative    Not on file     Social Determinants of Health     Financial Resource Strain: Not on file   Food Insecurity: Not on file   Transportation Needs: Not on file   Physical Activity: Not on file   Stress: Not on file   Social Connections: Not on file   Intimate Partner Violence: Not on file   Housing Stability: Not on file           ROS:  [x] All negative/unchanged except if checked.  Explain positive(checked items) below:  [] Constitutional  [] Eyes  [] Ear/Nose/Mouth/Throat  [] Respiratory  [] CV  [] GI  []   [] Musculoskeletal  [] Skin/Breast  [] Neurological  [] Endocrine  [] Heme/Lymph  [] Allergic/Immunologic    Explanation:     MEDICATIONS:    Current Facility-Administered Medications:     chlordiazePOXIDE (LIBRIUM) capsule 25 mg, 25 mg, Oral, q8h, MACHO Evans - CNP, 25 mg at 07/16/22 7456    acetaminophen (TYLENOL) tablet 650 mg, 650 mg, Oral, Q6H PRN, Светлана Denise MD    magnesium hydroxide (MILK OF MAGNESIA) 400 MG/5ML suspension 30 mL, 30 mL, Oral, Daily PRN, Светлана Denise MD    nicotine (NICODERM CQ) 21 MG/24HR 1 patch, 1 patch, TransDERmal, Daily, Светлана Denise MD, 1 patch at 07/15/22 0950    aluminum & magnesium hydroxide-simethicone (MAALOX) 200-200-20 MG/5ML suspension 30 mL, 30 mL, Oral, PRN, Светлана Denise MD    hydrOXYzine pamoate (VISTARIL) capsule 50 mg, 50 mg, Oral, TID PRN, Светлана Denise MD, 50 mg at 07/15/22 2213    haloperidol (HALDOL) tablet 5 mg, 5 mg, Oral, Q6H PRN **OR** haloperidol lactate (HALDOL) injection 5 mg, 5 mg, IntraMUSCular, Q6H PRN, Светлана Denise MD    melatonin tablet 3 mg, 3 mg, Oral, Nightly, Светлана Denise MD, 3 mg at 07/15/22 2211    divalproex (DEPAKOTE ER) extended release tablet 500 mg, 500 mg, Oral, BID, Светлана Denise MD, 500 mg at 07/15/22 2211    thiamine mononitrate tablet 100 mg, 100 mg, Oral, Daily, Rena Gallego MD, 100 mg at 07/15/22 0950    multivitamin 1 tablet, 1 tablet, Oral, Daily, Rena Gallego MD, 1 tablet at 14/43/87 7963    folic acid (FOLVITE) tablet 1 mg, 1 mg, Oral, Daily, Rena Gallego MD, 1 mg at 07/15/22 0950    cefTRIAXone (ROCEPHIN) injection 500 mg, 500 mg, IntraMUSCular, Once, DEE Pelletier      Examination:  /60   Pulse 76   Temp 97.5 °F (36.4 °C) (Oral)   Resp 14   Ht 5' 7\" (1.702 m)   Wt 243 lb (110.2 kg)   LMP  (LMP Unknown)   SpO2 96%   Breastfeeding No Comment: constant Bleeding, suppose to have procedure for HPV  BMI 38.06 kg/m²   Gait - steady  Medication side effects(SE): drowsy    Mental Status Examination:    Level of consciousness:  within normal limits   Appearance:  poor grooming and poor hygiene  Behavior/Motor:  no abnormalities noted  Attitude toward examiner:  evasive, guarded, and withdrawn  Speech:  spontaneous, normal rate, and normal volume   Mood: \"tired\"  Affect:  flat, constricted   Thought processes:  linear without flight of ideas or loose associations   Thought content:  somatic   Cognition:  oriented to person, place, and time   Concentration poor  Insight poor   Judgement poor     ASSESSMENT:   Patient symptoms are:  [] Well controlled  [] Improving  [] Worsening   No change      Diagnosis:   Active Problems:    Polysubstance abuse (HCC)    Moderate depressed [facility]  Resolved Problems:    * No resolved hospital problems. *      LABS:    No results for input(s): WBC, HGB, PLT in the last 72 hours. Recent Labs     07/14/22  1212      K 3.8      CO2 21*   BUN 11   CREATININE 0.8   GLUCOSE 133*     No results for input(s): BILITOT, ALKPHOS, AST, ALT in the last 72 hours.     Lab Results   Component Value Date/Time    LABAMPH NOT DETECTED 07/12/2022 09:15 PM    BARBSCNU NOT DETECTED 07/12/2022 09:15 PM    LABBENZ NOT DETECTED 07/12/2022 09:15 PM    LABMETH NOT DETECTED 07/12/2022 09:15 PM    OPIATESCREENURINE NOT DETECTED 07/12/2022 09:15 PM    PHENCYCLIDINESCREENURINE NOT DETECTED 07/12/2022 09:15 PM    ETOH <10 07/12/2022 09:15 PM     No results found for: TSH, FREET4  No results found for: LITHIUM  No results found for: VALPROATE, CBMZ    RISK ASSESSMENT    Treatment Plan:  Reviewed current Medications with the patient. Risks, benefits, side effects, drug-to-drug interactions and alternatives to treatment were discussed. Collateral information  CD evaluation  Encourage patient to attend group and other milieu activities. Discharge planning discussed with the patient and treatment team.    Medications  Continue Depakote  500 mg twice daily  Continue Librium 25 mg every 8 hours as needed for withdrawal        Prn Haldol 5mg and Vistaril 50mg q6hr for extreme agitation. Trazodone as ordered for insomnia  Vistaril as ordered for anxiety       PSYCHOTHERAPY/COUNSELING:  [x] Therapeutic interview  [x] Supportive  [] CBT  [] Ongoing  [] Other    [x] Patient continues to need, on a daily basis, active treatment furnished directly by or requiring the supervision of inpatient psychiatric personnel        Behavioral Services  Medicare Certification Upon Admission    I certify that this patient's inpatient psychiatric hospital admission is medically necessary for:    [x] (1) Treatment which could reasonably be expected to improve this patient's condition,       [x] (2) Or for diagnostic study;     AND     [x](2) The inpatient psychiatric services are provided while the individual is under the care of a physician and are included in the individualized plan of care.     Estimated length of stay/service 3-7 days    Plan for post-hospital care follow up with outpatient provider    Electronically signed by MACHO Hamm CNP on 1/22/0754 at 7:23 AM

## 2022-07-16 NOTE — PLAN OF CARE
Patient isolative to her room, with exception of coming out for meals. Eye contact fair. Affect flat, blunt. Denies suicidal,homicidal or AV hallucinations. Patient observed depress with isolating and flat affect. No anxiety reported or observed. Appetite is good. Sleep is good. Compliant with medications. No group attended.

## 2022-07-17 LAB
C. TRACHOMATIS DNA ,URINE: NEGATIVE
N. GONORRHOEAE DNA, URINE: NEGATIVE
SOURCE: NORMAL
VALPROIC ACID LEVEL: 58 MCG/ML (ref 50–100)

## 2022-07-17 PROCEDURE — 6370000000 HC RX 637 (ALT 250 FOR IP): Performed by: NURSE PRACTITIONER

## 2022-07-17 PROCEDURE — 6370000000 HC RX 637 (ALT 250 FOR IP): Performed by: PSYCHIATRY & NEUROLOGY

## 2022-07-17 PROCEDURE — 36415 COLL VENOUS BLD VENIPUNCTURE: CPT

## 2022-07-17 PROCEDURE — 1240000000 HC EMOTIONAL WELLNESS R&B

## 2022-07-17 PROCEDURE — 99232 SBSQ HOSP IP/OBS MODERATE 35: CPT | Performed by: NURSE PRACTITIONER

## 2022-07-17 PROCEDURE — 80164 ASSAY DIPROPYLACETIC ACD TOT: CPT

## 2022-07-17 RX ADMIN — MULTIVITAMIN TABLET 1 TABLET: TABLET at 08:39

## 2022-07-17 RX ADMIN — Medication 100 MG: at 08:39

## 2022-07-17 RX ADMIN — ACETAMINOPHEN 650 MG: 325 TABLET ORAL at 08:40

## 2022-07-17 RX ADMIN — HYDROXYZINE PAMOATE 50 MG: 50 CAPSULE ORAL at 16:55

## 2022-07-17 RX ADMIN — DIVALPROEX SODIUM 500 MG: 500 TABLET, DELAYED RELEASE ORAL at 08:39

## 2022-07-17 RX ADMIN — DIVALPROEX SODIUM 500 MG: 500 TABLET, DELAYED RELEASE ORAL at 22:26

## 2022-07-17 RX ADMIN — FOLIC ACID 1 MG: 1 TABLET ORAL at 08:39

## 2022-07-17 RX ADMIN — HYDROXYZINE PAMOATE 50 MG: 50 CAPSULE ORAL at 08:40

## 2022-07-17 RX ADMIN — MELATONIN 3 MG ORAL TABLET 3 MG: 3 TABLET ORAL at 22:26

## 2022-07-17 ASSESSMENT — PAIN SCALES - WONG BAKER
WONGBAKER_NUMERICALRESPONSE: 0

## 2022-07-17 ASSESSMENT — PAIN SCALES - GENERAL
PAINLEVEL_OUTOF10: 2
PAINLEVEL_OUTOF10: 0
PAINLEVEL_OUTOF10: 6
PAINLEVEL_OUTOF10: 0

## 2022-07-17 ASSESSMENT — PAIN DESCRIPTION - DESCRIPTORS: DESCRIPTORS: ACHING

## 2022-07-17 ASSESSMENT — PAIN DESCRIPTION - LOCATION: LOCATION: HEAD

## 2022-07-17 ASSESSMENT — PAIN - FUNCTIONAL ASSESSMENT
PAIN_FUNCTIONAL_ASSESSMENT: ACTIVITIES ARE NOT PREVENTED
PAIN_FUNCTIONAL_ASSESSMENT: ACTIVITIES ARE NOT PREVENTED

## 2022-07-17 NOTE — PROGRESS NOTES
BEHAVIORAL HEALTH FOLLOW-UP NOTE     7/17/2022     Patient was seen and examined in person, Chart reviewed   Patient's case discussed with staff/team    Chief Complaint: \"I I am doing good\"    Interim History:   Patient seen in her room she then states she feels tired. Denies SI/HI intent or plan denies any feelings of restless leg. States she wants to go to rehab center which is out more visible in the milieu socializing with select peers. Denies SI/HI intent or plan denies auditory or visual hallucinations she is eating well and sleeping well no neurovegetative signs of depression. Overall her affect is flat        Appetite:   [x] Normal/Unchanged  [] Increased  [] Decreased      Sleep:       [] Normal/Unchanged  [] Fair       [x] Poor              Energy:    [x] Normal/Unchanged  [] Increased  [] Decreased        SI [] Present  [x] Absent    HI  []Present  [x] Absent     Aggression:  [] yes  [x] no    Patient is [] able  [x] unable to CONTRACT FOR SAFETY     PAST MEDICAL/PSYCHIATRIC HISTORY:   Past Medical History:   Diagnosis Date    Anxious depression     Fracture of right ankle     Hypertension        FAMILY/SOCIAL HISTORY:  Family History   Problem Relation Age of Onset    Substance Abuse Mother     Substance Abuse Father     Substance Abuse Sister      Social History     Socioeconomic History    Marital status: Single     Spouse name: Not on file    Number of children: 2    Years of education: 12+2    Highest education level: Not on file   Occupational History     Employer: UNEMPLOYED     Comment: no income   Tobacco Use    Smoking status: Every Day     Packs/day: 1.00     Years: 10.00     Pack years: 10.00     Types: Cigarettes    Smokeless tobacco: Never   Vaping Use    Vaping Use: Some days    Substances: Nicotine    Devices: Pre-filled pod   Substance and Sexual Activity    Alcohol use:  Yes     Alcohol/week: 15.0 standard drinks     Types: 3 Cans of beer, 4 Shots of liquor, 8 Glasses of wine per week    Drug use: Not Currently     Frequency: 7.0 times per week     Types: Cocaine     Comment: \"all day every day\"    Sexual activity: Yes     Partners: Female, Male   Other Topics Concern    Not on file   Social History Narrative    Not on file     Social Determinants of Health     Financial Resource Strain: Not on file   Food Insecurity: Not on file   Transportation Needs: Not on file   Physical Activity: Not on file   Stress: Not on file   Social Connections: Not on file   Intimate Partner Violence: Not on file   Housing Stability: Not on file           ROS:  [x] All negative/unchanged except if checked.  Explain positive(checked items) below:  [] Constitutional  [] Eyes  [] Ear/Nose/Mouth/Throat  [] Respiratory  [] CV  [] GI  []   [] Musculoskeletal  [] Skin/Breast  [] Neurological  [] Endocrine  [] Heme/Lymph  [] Allergic/Immunologic    Explanation:     MEDICATIONS:    Current Facility-Administered Medications:     chlordiazePOXIDE (LIBRIUM) capsule 25 mg, 25 mg, Oral, E2X PRN, Providence Leventhal Dellick, APRN - CNP, 25 mg at 07/16/22 1641    divalproex (DEPAKOTE) DR tablet 500 mg, 500 mg, Oral, 2 times per day, Raad Knowles, APRN - CNP, 387 mg at 07/17/22 2895    acetaminophen (TYLENOL) tablet 650 mg, 650 mg, Oral, Q6H PRN, Dave Thompson MD, 650 mg at 07/17/22 0840    magnesium hydroxide (MILK OF MAGNESIA) 400 MG/5ML suspension 30 mL, 30 mL, Oral, Daily PRN, Dave Thompson MD    nicotine (NICODERM CQ) 21 MG/24HR 1 patch, 1 patch, TransDERmal, Daily, Dave Thompson MD, 1 patch at 07/16/22 0900    aluminum & magnesium hydroxide-simethicone (MAALOX) 200-200-20 MG/5ML suspension 30 mL, 30 mL, Oral, PRN, Dave Thompson MD    hydrOXYzine pamoate (VISTARIL) capsule 50 mg, 50 mg, Oral, TID PRN, Dave Thompson MD, 50 mg at 07/17/22 0840    haloperidol (HALDOL) tablet 5 mg, 5 mg, Oral, Q6H PRN **OR** haloperidol lactate (HALDOL) injection 5 mg, 5 mg, IntraMUSCular, Q6H PRN, Dave Thompson MD    melatonin tablet 3 mg, 3 mg, Oral, Nightly, Loida Vicente MD, 3 mg at 07/16/22 2214    thiamine mononitrate tablet 100 mg, 100 mg, Oral, Daily, Loida Vicente MD, 100 mg at 07/17/22 1411    multivitamin 1 tablet, 1 tablet, Oral, Daily, Loida Vicente MD, 1 tablet at 41/29/02 9512    folic acid (FOLVITE) tablet 1 mg, 1 mg, Oral, Daily, Loida Vicente MD, 1 mg at 07/17/22 7781    cefTRIAXone (ROCEPHIN) injection 500 mg, 500 mg, IntraMUSCular, Once, DEE Rascon      Examination:  /62   Pulse 71   Temp 97 °F (36.1 °C)   Resp 16   Ht 5' 7\" (1.702 m)   Wt 243 lb (110.2 kg)   LMP  (LMP Unknown)   SpO2 96%   Breastfeeding No Comment: constant Bleeding, suppose to have procedure for HPV  BMI 38.06 kg/m²   Gait - steady  Medication side effects(SE): drowsy    Mental Status Examination:    Level of consciousness:  within normal limits   Appearance:  poor grooming and poor hygiene  Behavior/Motor:  no abnormalities noted  Attitude toward examiner:  evasive, guarded, and withdrawn  Speech:  spontaneous, normal rate, and normal volume   Mood: \"tired\"  Affect:  flat, constricted   Thought processes:  linear without flight of ideas or loose associations   Thought content:  somatic   Cognition:  oriented to person, place, and time   Concentration poor  Insight poor   Judgement poor     ASSESSMENT:   Patient symptoms are:  [] Well controlled  [] Improving  [] Worsening   No change      Diagnosis:   Active Problems:    Polysubstance abuse (HCC)    Moderate depressed [facility]  Resolved Problems:    * No resolved hospital problems. *      LABS:    No results for input(s): WBC, HGB, PLT in the last 72 hours. Recent Labs     07/14/22  1212      K 3.8      CO2 21*   BUN 11   CREATININE 0.8   GLUCOSE 133*     No results for input(s): BILITOT, ALKPHOS, AST, ALT in the last 72 hours.     Lab Results   Component Value Date/Time    LABAMPH NOT DETECTED 07/12/2022 09:15 PM    BARBSCNU NOT DETECTED 07/12/2022 09:15 PM    LABBENZ NOT DETECTED 07/12/2022 09:15 PM    LABMETH NOT DETECTED 07/12/2022 09:15 PM    OPIATESCREENURINE NOT DETECTED 07/12/2022 09:15 PM    PHENCYCLIDINESCREENURINE NOT DETECTED 07/12/2022 09:15 PM    ETOH <10 07/12/2022 09:15 PM     No results found for: TSH, FREET4  No results found for: LITHIUM  Lab Results   Component Value Date    VALPROATE 58 07/17/2022       RISK ASSESSMENT    Treatment Plan:  Reviewed current Medications with the patient. Risks, benefits, side effects, drug-to-drug interactions and alternatives to treatment were discussed. Collateral information  CD evaluation  Encourage patient to attend group and other milieu activities. Discharge planning discussed with the patient and treatment team.    Medications  Continue Depakote  500 mg twice daily  Continue Librium 25 mg every 8 hours as needed for withdrawal        Prn Haldol 5mg and Vistaril 50mg q6hr for extreme agitation. Trazodone as ordered for insomnia  Vistaril as ordered for anxiety       PSYCHOTHERAPY/COUNSELING:  [x] Therapeutic interview  [x] Supportive  [] CBT  [] Ongoing  [] Other    [x] Patient continues to need, on a daily basis, active treatment furnished directly by or requiring the supervision of inpatient psychiatric personnel        Behavioral Services  Medicare Certification Upon Admission    I certify that this patient's inpatient psychiatric hospital admission is medically necessary for:    [x] (1) Treatment which could reasonably be expected to improve this patient's condition,       [x] (2) Or for diagnostic study;     AND     [x](2) The inpatient psychiatric services are provided while the individual is under the care of a physician and are included in the individualized plan of care.     Estimated length of stay/service 3-7 days    Plan for post-hospital care follow up with outpatient provider    Electronically signed by MACHO Hemphill CNP on 7/40/7692 at 9:22 AM

## 2022-07-17 NOTE — PROGRESS NOTES
Patient was out on unit this evening having hair braided. Had spent all time with exception of meals isolated in her room. Affect remains flat,sad, underlying irritability. Verbalizes anxious since coming out and was medicate see MAR. Denies suicidal,homicidal or AV hallucinations. Compliant with medications. No groups attended. Q 15 minute rounds continue.

## 2022-07-17 NOTE — PROGRESS NOTES
Pt alert, flat. Out on the unit and social with select peers. Denies SI, HI, and AVH. Remains depressed. Pt is currently sleeping in her room. Waiting on med nurse to give night meds. Denies any issues or needs.  Will continue to monitor

## 2022-07-18 PROCEDURE — 6370000000 HC RX 637 (ALT 250 FOR IP): Performed by: PSYCHIATRY & NEUROLOGY

## 2022-07-18 PROCEDURE — 1240000000 HC EMOTIONAL WELLNESS R&B

## 2022-07-18 PROCEDURE — 6370000000 HC RX 637 (ALT 250 FOR IP): Performed by: NURSE PRACTITIONER

## 2022-07-18 PROCEDURE — 99232 SBSQ HOSP IP/OBS MODERATE 35: CPT | Performed by: NURSE PRACTITIONER

## 2022-07-18 RX ADMIN — MULTIVITAMIN TABLET 1 TABLET: TABLET at 09:43

## 2022-07-18 RX ADMIN — HYDROXYZINE PAMOATE 50 MG: 50 CAPSULE ORAL at 13:42

## 2022-07-18 RX ADMIN — HALOPERIDOL 5 MG: 5 TABLET ORAL at 10:20

## 2022-07-18 RX ADMIN — DIVALPROEX SODIUM 500 MG: 500 TABLET, DELAYED RELEASE ORAL at 21:00

## 2022-07-18 RX ADMIN — FOLIC ACID 1 MG: 1 TABLET ORAL at 09:43

## 2022-07-18 RX ADMIN — MELATONIN 3 MG ORAL TABLET 3 MG: 3 TABLET ORAL at 21:00

## 2022-07-18 RX ADMIN — DIVALPROEX SODIUM 500 MG: 500 TABLET, DELAYED RELEASE ORAL at 09:43

## 2022-07-18 RX ADMIN — Medication 100 MG: at 09:43

## 2022-07-18 ASSESSMENT — PAIN SCALES - GENERAL: PAINLEVEL_OUTOF10: 0
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
## 2022-07-18 NOTE — PLAN OF CARE
Pt is stable at this time. Pt has remained in general control. Pt is cooperative but advised she remains irritated about being here. This RN attempted to again clarify reasoning for pt being here. Pt appears to have poor insight thinking she is here due her father. \"I belong on the streets, it is where I need to be\" pr pt. Will follow and monitor.
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
## 2022-07-18 NOTE — CARE COORDINATION
ARNOLDO received a voicemail from pt father Austin Lowery. Austin Lowery reports pt is going to try everything to get out. He reports pt told both himself and her best friend that pt just wants to leave and get high and that she is no longer going to rehab. Zack reports pt stated she got 3 hots and a cot and is now ready to be discharged. 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 Austin Lowery reports he was told by an RN that he can get custody of pt so he can send her to [facility]. Austin Lowery reports if pt discharges to the streets he will just put herself in harms way. Pt was seen during treatment team. Pt reports she is feeling well, denied SI/HI/AVH. Pt reports she does want to go to substance abuse rehab in the future but she is not ready at this time. Pt reports she isnt ready to change her life and wants to be discharged to the streets. Pt was advised by treatment team that she cannot be discharged to the streets. Pt was advised she will need somewhere to stay at discharge and we will need to confirm with wherever she plans to go that she can go there. Pt stated she will stay with her father. SW advised pt that her father is very concerned for her and wants her to go to substance abuse rehab. Pt then provided an address for a sober house she was staying at prior to her relapse. ARNOLDO advised pt that SW can reach out the sober house however almost all sober houses require individuals to complete a substance abuse program before they can stay there. Pt upset and feels that she is being held here based on her fathers decision. NP explained to pt that the discharge is now up to her father, however it is up to him on if she can stay there or not. Pt anxious, depressed, underlying irritability, and poor insight/judgement into need for treatment. ARNOLDO contacted pt dad Wandyalen Cabot  (HEIDI signed).  Peggye Cabot reports pt was acting up on the phone a few hours ago saying she is just going to go back to the streets, get high, and prostitute herself. Arlyn Burger reports pt called a friend and said she missed walking the streets. Arlyn Burger reports pt sister told him that pt has been selling her body for awhile. Arlyn Burger reports pt reportedly smoked $14,000 worth of drugs. Arlyn Burger reports pt got some rest here and is now just trying to go back to the streets. Arlyn Burger reports pt is talking crazy, saying if he doesn't sign her out she will kill herself. Arlyn Burger reports pt is not fit to be in the streets. Arlyn Burger reports pt was sleeping in abandoned houses and isn't responsible for herself. Arlyn Burger asked about guardianship of pt. SW advised him that the navigator can call him with more information on guardianship, however pt more than likely will not qualify for it. Arlyn Burger reports if she doesn't go to substance abuse rehab then she will go back to the streets. Arlyn Burger reports pt isnt from here and she has no support here. Cesialiliane Burger asked where pt will go at discharge. SW advised him that at this time pt only option would be the [facility]. Arlyn Burger reports pt isnt going to go there, she will just go back to the streets. Arlyn Burger reports pt will die on the streets. He reports pt is a manipulator and we cannot listen to her wants because she will pick the wrong things. 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 ARNOLDO advised ely that we hear his concerns however the discharge plan is ultimately up to the pt. ARNOLDO advised Cesialiliane Tao that we cannot discharge pt to substance abuse rehab and or long term care if pt does not want to go there. Arlyn Burger is very upset by this information, stated \"send her to the streets to die\" and terminated the phone call.

## 2022-07-18 NOTE — PROGRESS NOTES
BEHAVIORAL HEALTH FOLLOW-UP NOTE     7/18/2022     Patient was seen and examined in person, Chart reviewed   Patient's case discussed with staff/team    Chief Complaint: \"I do not want to go to rehab\"    Interim History:   Patient seen in treatment team with mitral brighter affect. She changed her hair style. She is smiling and pleasant. She states that she does not want to go to rehab despite this being what her father wants. She states that she has been in sobriety before she recently relapsed. She states that she understands the concepts of sobriety and that she \"is not ready to go back to rehab. \"  She vehemently denies SI/HI intent or plan she denies auditory or visual hallucinations she was educated regarding the risks associate with continued drug abuse which includes putting herself at risk on the streets, accidental overdose, acting out impulsively hurting herself or someone else even though may be unintentional and she demonstrate understanding of all these risk factors. She voices frustration her father stating that she is 39years old and is an adult and can make her own decisions. Patient is upset that her father would not allow her to live with him on discharge as father wants patient to go to rehab on discharge. Patient adamantly states that she does not want to go to rehab and that she is not interested in pursuing her sobriety at this time.   She vehemently denies SI/HI intent or plan denies auditory visual hallucinations eating well sleeping well no neurovegetative signs of depression        Appetite:   [x] Normal/Unchanged  [] Increased  [] Decreased      Sleep:       [] Normal/Unchanged  [] Fair       [x] Poor              Energy:    [x] Normal/Unchanged  [] Increased  [] Decreased        SI [] Present  [x] Absent    HI  []Present  [x] Absent     Aggression:  [] yes  [x] no    Patient is [] able  [x] unable to CONTRACT FOR SAFETY     PAST MEDICAL/PSYCHIATRIC HISTORY:   Past Medical History:   Diagnosis Date    Anxious depression     Fracture of right ankle     Hypertension        FAMILY/SOCIAL HISTORY:  Family History   Problem Relation Age of Onset    Substance Abuse Mother     Substance Abuse Father     Substance Abuse Sister      Social History     Socioeconomic History    Marital status: Single     Spouse name: Not on file    Number of children: 2    Years of education: 12+2    Highest education level: Not on file   Occupational History     Employer: UNEMPLOYED     Comment: no income   Tobacco Use    Smoking status: Every Day     Packs/day: 1.00     Years: 10.00     Pack years: 10.00     Types: Cigarettes    Smokeless tobacco: Never   Vaping Use    Vaping Use: Some days    Substances: Nicotine    Devices: Pre-filled pod   Substance and Sexual Activity    Alcohol use: Yes     Alcohol/week: 15.0 standard drinks     Types: 3 Cans of beer, 4 Shots of liquor, 8 Glasses of wine per week    Drug use: Not Currently     Frequency: 7.0 times per week     Types: Cocaine     Comment: \"all day every day\"    Sexual activity: Yes     Partners: Female, Male   Other Topics Concern    Not on file   Social History Narrative    Not on file     Social Determinants of Health     Financial Resource Strain: Not on file   Food Insecurity: Not on file   Transportation Needs: Not on file   Physical Activity: Not on file   Stress: Not on file   Social Connections: Not on file   Intimate Partner Violence: Not on file   Housing Stability: Not on file           ROS:  [x] All negative/unchanged except if checked.  Explain positive(checked items) below:  [] Constitutional  [] Eyes  [] Ear/Nose/Mouth/Throat  [] Respiratory  [] CV  [] GI  []   [] Musculoskeletal  [] Skin/Breast  [] Neurological  [] Endocrine  [] Heme/Lymph  [] Allergic/Immunologic    Explanation:     MEDICATIONS:    Current Facility-Administered Medications:     chlordiazePOXIDE (LIBRIUM) capsule 25 mg, 25 mg, Oral, Y2X PRN, Providence Leventhal Dellick, APRN - CNP, 25 mg at 07/16/22 1641    divalproex (DEPAKOTE) DR tablet 500 mg, 500 mg, Oral, 2 times per day, Stefan Jeronimo APRN - CNP, 658 mg at 07/18/22 0943    acetaminophen (TYLENOL) tablet 650 mg, 650 mg, Oral, Q6H PRN, Waldemar Hammond MD, 650 mg at 07/17/22 0840    magnesium hydroxide (MILK OF MAGNESIA) 400 MG/5ML suspension 30 mL, 30 mL, Oral, Daily PRN, Waldemar Hammond MD    nicotine (NICODERM CQ) 21 MG/24HR 1 patch, 1 patch, TransDERmal, Daily, Waldemar Hammond MD, 1 patch at 07/18/22 0945    aluminum & magnesium hydroxide-simethicone (MAALOX) 200-200-20 MG/5ML suspension 30 mL, 30 mL, Oral, PRN, Waldemar Hammond MD    hydrOXYzine pamoate (VISTARIL) capsule 50 mg, 50 mg, Oral, TID PRN, Waldemar Hammond MD, 50 mg at 07/18/22 1342    haloperidol (HALDOL) tablet 5 mg, 5 mg, Oral, Q6H PRN, 5 mg at 07/18/22 1020 **OR** haloperidol lactate (HALDOL) injection 5 mg, 5 mg, IntraMUSCular, Q6H PRN, Waldemar Hammond MD    melatonin tablet 3 mg, 3 mg, Oral, Nightly, Waldemar Hammond MD, 3 mg at 07/17/22 2226    thiamine mononitrate tablet 100 mg, 100 mg, Oral, Daily, Waldemar Hammond MD, 100 mg at 07/18/22 7803    multivitamin 1 tablet, 1 tablet, Oral, Daily, Waldemar Hammond MD, 1 tablet at 27/68/53 0522    folic acid (FOLVITE) tablet 1 mg, 1 mg, Oral, Daily, Waldemar Hammond MD, 1 mg at 07/18/22 0943    cefTRIAXone (ROCEPHIN) injection 500 mg, 500 mg, IntraMUSCular, Once, DEE Lora      Examination:  /64   Pulse 73   Temp 97.1 °F (36.2 °C)   Resp 15   Ht 5' 7\" (1.702 m)   Wt 243 lb (110.2 kg)   LMP  (LMP Unknown)   SpO2 96%   Breastfeeding No Comment: constant Bleeding, suppose to have procedure for HPV  BMI 38.06 kg/m²   Gait - steady  Medication side effects(SE): drowsy    Mental Status Examination:    Level of consciousness:  within normal limits   Appearance:  poor grooming and poor hygiene  Behavior/Motor:  no abnormalities noted  Attitude toward examiner:  evasive, guarded, and withdrawn  Speech:  spontaneous, normal rate, and normal volume   Mood: \"tired\"  Affect:  flat, constricted   Thought processes:  linear without flight of ideas or loose associations   Thought content:  somatic   Cognition:  oriented to person, place, and time   Concentration poor  Insight poor   Judgement poor     ASSESSMENT:   Patient symptoms are:  [] Well controlled  [] Improving  [] Worsening   No change      Diagnosis:   Principal Problem: Moderate depressed bipolar II disorder (HCC)  Active Problems:    Polysubstance abuse ([facility] [address]  Resolved Problems:    * No resolved hospital problems. *      LABS:    No results for input(s): WBC, HGB, PLT in the last 72 hours. No results for input(s): NA, K, CL, CO2, BUN, CREATININE, GLUCOSE in the last 72 hours. No results for input(s): BILITOT, ALKPHOS, AST, ALT in the last 72 hours. Lab Results   Component Value Date/Time    LABAMPH NOT DETECTED 07/12/2022 09:15 PM    BARBSCNU NOT DETECTED 07/12/2022 09:15 PM    LABBENZ NOT DETECTED 07/12/2022 09:15 PM    LABMETH NOT DETECTED 07/12/2022 09:15 PM    OPIATESCREENURINE NOT DETECTED 07/12/2022 09:15 PM    PHENCYCLIDINESCREENURINE NOT DETECTED 07/12/2022 09:15 PM    ETOH <10 07/12/2022 09:15 PM     No results found for: TSH, FREET4  No results found for: LITHIUM  Lab Results   Component Value Date    VALPROATE 58 07/17/2022       RISK ASSESSMENT    Treatment Plan:  Reviewed current Medications with the patient. Risks, benefits, side effects, drug-to-drug interactions and alternatives to treatment were discussed. Collateral information  CD evaluation  Encourage patient to attend group and other milieu activities. Discharge planning discussed with the patient and treatment team.    Medications  Continue Depakote  500 mg twice daily  Continue Librium 25 mg every 8 hours as needed for withdrawal        Prn Haldol 5mg and Vistaril 50mg q6hr for extreme agitation.   Trazodone as ordered for insomnia  Vistaril as ordered for anxiety       PSYCHOTHERAPY/COUNSELING:  [x] Therapeutic interview  [x] Supportive  [] CBT  [] Ongoing  [] Other    [x] Patient continues to need, on a daily basis, active treatment furnished directly by or requiring the supervision of inpatient psychiatric personnel        Behavioral Services  Medicare Certification Upon Admission    I certify that this patient's inpatient psychiatric hospital admission is medically necessary for:    [x] (1) Treatment which could reasonably be expected to improve this patient's condition,       [x] (2) Or for diagnostic study;     AND     [x](2) The inpatient psychiatric services are provided while the individual is under the care of a physician and are included in the individualized plan of care.     Estimated length of stay/service 3-7 days    Plan for post-hospital care follow up with outpatient provider    Electronically signed by MACHO Valles CNP on 5/35/8426 at 2:56 PM

## 2022-07-18 NOTE — BH NOTE
Pt denies suicidal / homicidal ideations. Pt denies hallucinations. Pt is cooperative. No further behavioral concerns at this time. Will follow and monitor.

## 2022-07-18 NOTE — BH NOTE
[id number] [facility] Interdisciplinary Treatment Plan Note     Review Date & Time: 7-18-22  1000 am    Patient was in treatment team.    Estimated Length of Stay Update:  2-4 days   Estimated Discharge Date Update: 2-4 days    EDUCATION:   Learner Progress Toward Treatment Goals: Reviewed results and recommendations of this team    Method: Small group    Outcome: Verbalized understanding    PATIENT GOALS: pt did not report a goal during first morning assessment. PLAN/TREATMENT RECOMMENDATIONS UPDATE: Continue to monitor pt progress and assess pt responses. Maintain pr safety. GOALS UPDATE:  Time frame for Short-Term Goals: Daily re assessment.        Anthony Weller RN

## 2022-07-19 VITALS
WEIGHT: 243 LBS | HEIGHT: 67 IN | RESPIRATION RATE: 16 BRPM | DIASTOLIC BLOOD PRESSURE: 74 MMHG | TEMPERATURE: 98.6 F | OXYGEN SATURATION: 96 % | BODY MASS INDEX: 38.14 KG/M2 | HEART RATE: 79 BPM | SYSTOLIC BLOOD PRESSURE: 132 MMHG

## 2022-07-19 PROCEDURE — 6370000000 HC RX 637 (ALT 250 FOR IP): Performed by: PSYCHIATRY & NEUROLOGY

## 2022-07-19 PROCEDURE — 6370000000 HC RX 637 (ALT 250 FOR IP): Performed by: NURSE PRACTITIONER

## 2022-07-19 PROCEDURE — 99239 HOSP IP/OBS DSCHRG MGMT >30: CPT | Performed by: NURSE PRACTITIONER

## 2022-07-19 RX ORDER — MULTIVITAMIN WITH IRON
1 TABLET ORAL DAILY
Qty: 30 TABLET | Refills: 0 | Status: SHIPPED | OUTPATIENT
Start: 2022-07-20 | End: 2022-08-19

## 2022-07-19 RX ORDER — LANOLIN ALCOHOL/MO/W.PET/CERES
3 CREAM (GRAM) TOPICAL NIGHTLY
Qty: 30 TABLET | Refills: 0 | Status: SHIPPED | OUTPATIENT
Start: 2022-07-19 | End: 2022-08-18

## 2022-07-19 RX ORDER — FOLIC ACID 1 MG/1
1 TABLET ORAL DAILY
Qty: 30 TABLET | Refills: 3 | Status: SHIPPED | OUTPATIENT
Start: 2022-07-20

## 2022-07-19 RX ORDER — THIAMINE MONONITRATE (VIT B1) 100 MG
100 TABLET ORAL DAILY
Qty: 30 TABLET | Refills: 0 | Status: SHIPPED | OUTPATIENT
Start: 2022-07-20 | End: 2022-08-19

## 2022-07-19 RX ORDER — DIVALPROEX SODIUM 500 MG/1
500 TABLET, DELAYED RELEASE ORAL EVERY 12 HOURS SCHEDULED
Qty: 60 TABLET | Refills: 0 | Status: SHIPPED | OUTPATIENT
Start: 2022-07-19 | End: 2022-08-18

## 2022-07-19 RX ORDER — NICOTINE 21 MG/24HR
1 PATCH, TRANSDERMAL 24 HOURS TRANSDERMAL DAILY
Qty: 30 PATCH | Refills: 0
Start: 2022-07-20 | End: 2022-08-19

## 2022-07-19 RX ADMIN — FOLIC ACID 1 MG: 1 TABLET ORAL at 08:48

## 2022-07-19 RX ADMIN — MULTIVITAMIN TABLET 1 TABLET: TABLET at 08:48

## 2022-07-19 RX ADMIN — Medication 100 MG: at 08:48

## 2022-07-19 RX ADMIN — DIVALPROEX SODIUM 500 MG: 500 TABLET, DELAYED RELEASE ORAL at 08:48

## 2022-07-19 ASSESSMENT — PAIN SCALES - GENERAL
PAINLEVEL_OUTOF10: 0
PAINLEVEL_OUTOF10: 0

## 2022-07-19 NOTE — CARE COORDINATION
SW met with pt. Pt reports feeling well today, denied SI/HI/AVH. Pt stated she wants to go to rehab but she is not fully ready to go at this time. Pt reports she would like to go to the [facility] at time of discharge. Pt expressed feeling ready to discharge. SW asked pt if she would like to talk with a  about her substance abuse hx prior to discharge. Pt was agreeable to this. SW provided pt with the number for the [facility] and advised her she will have to call and complete the phone screening prior to determine if they can accept her. Pt was cooperative, pleasant, with good eye contact, clear speech, and fair insight/judgement. SW spoke with peer support and requested that they come and talk with the pt.

## 2022-07-19 NOTE — PLAN OF CARE
Pt denies suicidal / homicidal ideations. Pt denies hallucinations. Pt is cooperative at this time and without behavioral concerns presently. No other complications. Will follow and monitor.

## 2022-07-19 NOTE — CARE COORDINATION
Pt approached ARNOLDO and stated she completed the phone screening with the [address] [place] [address] and she has been approved to go there today. Pt stated she used to be active with [facility] in the past and would like to be referred there again at time of discharge. SW contacted the [address] [place] [address] and was advised pt has been accepted and they do have a bed for her today. Pt will need to come with a copy of her ID and her medications. ARNOLDO contacted [facility] and scheduled a medication management appointment on 7/25.

## 2022-07-19 NOTE — BH NOTE
[facility]  Discharge Note    Pt discharged with followings belongings:   Dental Appliances: None  Vision - Corrective Lenses: None  Hearing Aid: None  Jewelry: None  Body Piercings Removed: N/A  Clothing: Footwear, Pants, Shirt, Undergarments, Other (Comment) (pr shoes, 6 pcs underwears, 2 brazziers, 2 pants, 2 shirts)  Other Valuables: Cigarettes, Lighter/Matches   Valuables sent home with pt. or returned to patient. Patient education on aftercare instructions: yes . Patient verbalize understanding of AVS: yes. Status EXAM upon discharge:  Mental Status and Behavioral Exam  Normal: No  Level of Assistance: Independent/Self  Facial Expression: Flat, Sad  Affect: Blunt  Level of Consciousness: Alert  Frequency of Checks: 4 times per hour, close  Mood:Normal: No  Mood: Sad  Motor Activity:Normal: No  Motor Activity: Decreased  Eye Contact: Fair  Observed Behavior: Guarded  Sexual Misconduct History: Current - no  Preception: Shelby to person, Shelby to time, Shelby to place, [place] to situation  Attention:Normal: No  Attention: Distractible  Thought Processes: Circumstantial  Thought Content:Normal: No  Thought Content: Poverty of content  Depression Symptoms: Impaired concentration  Anxiety Symptoms: Generalized  Danielle Symptoms: No problems reported or observed. Hallucinations: None  Delusions: Yes  Delusions: Persecutory  Memory:Normal: Yes  Memory: Confabulation  Insight and Judgment: No  Insight and Judgment: Poor insight    Tobacco Screening:  Practical Counseling, on admission, miller X, if applicable and completed (first 3 are required if patient doesn't refuse):             ( x) Recognizing danger situations (included triggers and roadblocks)                    ( x) Coping skills (new ways to manage stress,relaxation techniques, changing routine, distraction)                                                           ( x) Basic information about quitting (benefits of quitting, techniques in how to quit, available resources  ( ) Referral for counseling faxed to [facility]                                                                                                                   ( ) Patient refused counseling  ( ) Patient refused referral  ( ) Patient refused prescription upon discharge  ( ) Patient has not smoked in the last 30 days    Metabolic Screening:    No results found for: LABA1C    No results found for: CHOL  No results found for: TRIG  No results found for: HDL  No components found for: LDLCAL  No results found for: Liv Castro RN

## 2022-07-19 NOTE — CARE COORDINATION
SW Supervisor called pt's father [facility] (HEIDI signed) to discuss his concerns with patient discharging today. [facility] reported that patient is \"going to leave there and use again\". SW Supervisor provided active listening, emotional support and education on substance abuse disorders. Pt's father stated that he is 21 years sober and \"understands\" addiction. He reported that his main concern is that patient will start using again and he wants her to discharge to a substance abuse rehab. SW explained to pt's father that substance abuse resources were discussed with patient and she was not receptive to substance abuse treatment at this time. Pt's father was not receptive to the information, sounded very upset and reported that she \"needs on the right medication to make better choices\". Empathetic listening and emotional support were provided. Pt's father's concerns were substance abuse related, he expressed no mental health concerns at this time. ARNOLDO discussed conversation with NP Shaye Iniguez.     LINDA Richard, [facility] 19 Work Supervisor

## 2022-07-19 NOTE — GROUP NOTE
Group Therapy Note    Date: 7/19/2022    Group Start Time: 1000  Group End Time: 3028  Group Topic: Psychoeducation    [facility]        Group Therapy Note    Attendees: 10       Notes: Attended discussion on personal responsibility. Accepting of handout and sharing experiences with peers. Status After Intervention:  Improved    Participation Level:  Active Listener and Interactive    Participation Quality: Appropriate, Attentive, and Sharing      Speech:  normal      Thought Process/Content: Logical      Affective Functioning: Congruent      Mood: euthymic      Level of consciousness:  Alert, Oriented x4, and Attentive      Response to Learning: Capable of insight, Able to change behavior, and Progressing to goal      Endings: None Reported    Modes of Intervention: Education, Support, Socialization, and Exploration      Discipline Responsible: Psychoeducational Specialist      Signature:  Jonas Salinas

## 2022-07-19 NOTE — PROGRESS NOTES
Attended peer recovery group. Active and engaged sharing positive affirmations. .  Was 1 of 10 in attendance.

## 2022-07-19 NOTE — DISCHARGE SUMMARY
DISCHARGE SUMMARY      Patient ID:  Kailash Chirinos  [id number]  63 y.o.  1985    Admit date: 7/12/2022    Discharge date and time: 7/19/2022    Admitting Physician: Corine Kate MD     Discharge Physician: Dr Cherise Felix MD    Discharge Diagnoses:   Patient Active Problem List   Diagnosis    Post-operative pain    Polysubstance abuse ([facility] [address]    Moderate depressed bipolar II disorder ([facility] [address]       Admission Condition: poor    Discharged Condition: stable    Admission Circumstance: presented to the ED reporting suicidal ideations with a plan to overdose on her medications or substances or run into traffic. PAST MEDICAL/PSYCHIATRIC HISTORY:   Past Medical History:   Diagnosis Date    Anxious depression     Fracture of right ankle     Hypertension        FAMILY/SOCIAL HISTORY:  Family History   Problem Relation Age of Onset    Substance Abuse Mother     Substance Abuse Father     Substance Abuse Sister      Social History     Socioeconomic History    Marital status: Single     Spouse name: Not on file    Number of children: 2    Years of education: 12+2    Highest education level: Not on file   Occupational History     Employer: UNEMPLOYED     Comment: no income   Tobacco Use    Smoking status: Every Day     Packs/day: 1.00     Years: 10.00     Pack years: 10.00     Types: Cigarettes    Smokeless tobacco: Never   Vaping Use    Vaping Use: Some days    Substances: Nicotine    Devices: Pre-filled pod   Substance and Sexual Activity    Alcohol use:  Yes     Alcohol/week: 15.0 standard drinks     Types: 3 Cans of beer, 4 Shots of liquor, 8 Glasses of wine per week    Drug use: Not Currently     Frequency: 7.0 times per week     Types: Cocaine     Comment: \"all day every day\"    Sexual activity: Yes     Partners: Female, Male   Other Topics Concern    Not on file   Social History Narrative    Not on file     Social Determinants of Health     Financial Resource Strain: Not on file   Food Insecurity: Not on file Transportation Needs: Not on file   Physical Activity: Not on file   Stress: Not on file   Social Connections: Not on file   Intimate Partner Violence: Not on file   Housing Stability: Not on file       MEDICATIONS:    Current Facility-Administered Medications:     chlordiazePOXIDE (LIBRIUM) capsule 25 mg, 25 mg, Oral, G6A PRN, Henry Tripp Valentin APRN - CNP, 25 mg at 07/16/22 1641    divalproex (DEPAKOTE) DR tablet 500 mg, 500 mg, Oral, 2 times per day, Salvatore Howard APRN - CNP, 251 mg at 07/19/22 0848    acetaminophen (TYLENOL) tablet 650 mg, 650 mg, Oral, Q6H PRN, Ora Dancer, MD, 650 mg at 07/17/22 0840    magnesium hydroxide (MILK OF MAGNESIA) 400 MG/5ML suspension 30 mL, 30 mL, Oral, Daily PRN, Ora Dancer, MD    nicotine (NICODERM CQ) 21 MG/24HR 1 patch, 1 patch, TransDERmal, Daily, Ora Dancer, MD, 1 patch at 07/19/22 0847    aluminum & magnesium hydroxide-simethicone (MAALOX) 200-200-20 MG/5ML suspension 30 mL, 30 mL, Oral, PRN, Ora Dancer, MD    hydrOXYzine pamoate (VISTARIL) capsule 50 mg, 50 mg, Oral, TID PRN, Ora Dancer, MD, 50 mg at 07/18/22 1342    haloperidol (HALDOL) tablet 5 mg, 5 mg, Oral, Q6H PRN, 5 mg at 07/18/22 1020 **OR** haloperidol lactate (HALDOL) injection 5 mg, 5 mg, IntraMUSCular, Q6H PRN, Ora Dancer, MD    melatonin tablet 3 mg, 3 mg, Oral, Nightly, Ora Dancer, MD, 3 mg at 07/18/22 2100    thiamine mononitrate tablet 100 mg, 100 mg, Oral, Daily, Ora Dancer, MD, 100 mg at 07/19/22 0848    multivitamin 1 tablet, 1 tablet, Oral, Daily, Ora Dancer, MD, 1 tablet at 26/95/34 3140    folic acid (FOLVITE) tablet 1 mg, 1 mg, Oral, Daily, Ora Dancer, MD, 1 mg at 07/19/22 0848    cefTRIAXone (ROCEPHIN) injection 500 mg, 500 mg, IntraMUSCular, Once, DEE Brush    Examination:  /74   Pulse 79   Temp 98.6 °F (37 °C) (Oral)   Resp 16   Ht 5' 7\" (1.702 m)   Wt 243 lb (110.2 kg)   LMP  (LMP Unknown)   SpO2 96% Breastfeeding No Comment: constant Bleeding, suppose to have procedure for HPV  BMI 38.06 kg/m²   Gait - steady    HOSPITAL COURSE[de-identified]  Patient was admitted to the unit on 7/12/2022 was closely monitored for suicidal ideations. She was evaluated and treated with Depakote 500 mg twice daily for mood stabilization. Medical events were insignificant and patient continued to improve on the floor. She started coming out of her room she was attending groups to socializing with peers. She never made any suicidal statements or any suicidal gestures while in the unit. Social workers obtain confirmation patient's father who was able to voice any concerns that he had. Patient's father did express significant concerns regarding patient's drug use and situations the patient placed herself in regarding her drug use. Patient was educated by multiple staff members that patient is not interested in inpatient rehab at this time and that going to rehab is patient's choice. Patient is educated about the guardianship process. Patient seen in treatment team prior to discharge where patient was educated in from the treatment team regarding her ongoing drug use including risks of harm to self or others even though may be unintentional.  She was educated about unintentional overdoses as well as being in risky situations due to her drug use. Patient demonstrated understanding of these risks and has the capacity understand his risk. She indicated that she is not suicidal homicidal she states that at some point she will go to rehab but that she is \"not ready right now. \"  She indicates that this time she may not be ready to stop using drugs and that she will seek treatment when she is ready.   She vehemently denies being suicidal and she is future oriented looking forward to getting her life back on track how has been in the past she states at 1 point she was sober  is hoping to do that again in the future but states right now she is not ready to be sober. Treatment team felt the patient obtain the maximum benefit for her hospitalization She was set up with an outpatient mental health agency for outpatient follow-up services . At the time of discharge patient  did not show impulsive behavior. She was up on the unit she was attending groups and socializing with peers. She vehemently denied any suicidal homicidal ideations intent or plan. She was eating well and sleeping well there are no neurovegetative signs or symptoms of depression she denied any auditory visualizations. There are no overt or covert signs psychosis. She was appreciative of the help that she received here. This patient no longer meets criteria for inpatient hospitalization. No AVH or paranoid thoughts  No hopeless or worthless feeling  No active SI/HI  Appetite:  [x] Normal  [] Increased  [] Decreased    Sleep:       [x] Normal  [] Fair       [] Poor            Energy:    [x] Normal  [] Increased  [] Decreased     SI [] Present  [x] Absent  HI  []Present  [x] Absent   Aggression:  [] yes  [x] no  Patient is [x] able  [] unable to CONTRACT FOR SAFETY   Medication side effects(SE):  [x] None(Psych. Meds.) [] Other      Mental Status Examination on discharge:    Level of consciousness:  within normal limits   Appearance:  well-appearing  Behavior/Motor:  no abnormalities noted  Attitude toward examiner:  attentive and good eye contact  Speech:  spontaneous, normal rate and normal volume   Mood: \" My mood is good. \"  Affect: Appropriate and pleasant  Thought processes: Linear without flight of ideas loose associations  Thought content: Devoid of any auditory visual hallucinations delusions or other perceptual normalities.   Denies SI/HI intent or plan  Cognition:  oriented to person, place, and time   Concentration intact  Memory intact  Insight good   Judgement fair   Fund of Knowledge adequate      ASSESSMENT:  Patient symptoms are:  [x] Well controlled  [x] Improving  [] Worsening  [] No change    Reason for more than one antipsychotic:  [x] N/A  [] 3 Failed Monotherapy attempts (Drugs tried:)  [] Crossover to a new antipsychotic  [] Taper to Monotherapy from Polypharmacy  [] Augmentation of clozapine therapy due to treatment resistance to single therapy    Diagnosis:  Principal Problem: Moderate depressed bipolar II disorder (HCC)  Active Problems:    Polysubstance abuse ([facility] [address]  Resolved Problems:    * No resolved hospital problems. *      LABS:    No results for input(s): WBC, HGB, PLT in the last 72 hours. No results for input(s): NA, K, CL, CO2, BUN, CREATININE, GLUCOSE in the last 72 hours. No results for input(s): BILITOT, ALKPHOS, AST, ALT in the last 72 hours. Lab Results   Component Value Date/Time    LABAMPH NOT DETECTED 07/12/2022 09:15 PM    BARBSCNU NOT DETECTED 07/12/2022 09:15 PM    LABBENZ NOT DETECTED 07/12/2022 09:15 PM    LABMETH NOT DETECTED 07/12/2022 09:15 PM    OPIATESCREENURINE NOT DETECTED 07/12/2022 09:15 PM    PHENCYCLIDINESCREENURINE NOT DETECTED 07/12/2022 09:15 PM    ETOH <10 07/12/2022 09:15 PM     No results found for: TSH, FREET4  No results found for: LITHIUM  Lab Results   Component Value Date    VALPROATE 58 07/17/2022       RISK ASSESSMENT AT DISCHARGE: Low risk for suicide and homicide. Treatment Plan:  Reviewed current Medications with the patient. Education provided on the complaince with treatment. Risks, benefits, side effects, drug-to-drug interactions and alternatives to treatment were discussed. Encourage patient to attend outpatient follow up appointment and therapy. Patient was advised to call the outpatient provider, visit the nearest ED or call 911 if symptoms are not manageable. Patient's family member was contacted prior to the discharge.          Medication List        START taking these medications      divalproex 500 MG DR tablet  Commonly known as: DEPAKOTE  Take 1 tablet by mouth in the morning and 1 tablet before bedtime. folic acid 1 MG tablet  Commonly known as: FOLVITE  Take 1 tablet by mouth in the morning. Start taking on: July 20, 2022     melatonin 3 MG Tabs tablet  Take 1 tablet by mouth nightly     multivitamin Tabs tablet  Take 1 tablet by mouth in the morning. Start taking on: July 20, 2022     nicotine 21 MG/24HR  Commonly known as: NICODERM CQ  Place 1 patch onto the skin in the morning. Start taking on: July 20, 2022     vitamin B-1 100 MG tablet  Commonly known as: THIAMINE  Take 1 tablet by mouth in the morning.   Start taking on: July 20, 2022            CONTINUE taking these medications      aspirin 325 MG EC tablet  Take 1 tablet by mouth daily     pantoprazole 40 MG tablet  Commonly known as: PROTONIX  Take 1 tablet by mouth every morning (before breakfast)            STOP taking these medications      doxepin 25 MG capsule  Commonly known as: SINEQUAN     prazosin 1 MG capsule  Commonly known as: MINIPRESS     QUEtiapine 200 MG tablet  Commonly known as: SEROQUEL               Where to Get Your Medications        These medications were sent to [facility] [address] [address]      Phone: [phone]   divalproex 500 MG DR tablet  folic acid 1 MG tablet  melatonin 3 MG Tabs tablet  multivitamin Tabs tablet  vitamin B-1 100 MG tablet       Information about where to get these medications is not yet available    Ask your nurse or doctor about these medications  nicotine 21 MG/24HR       Patient is counseled if she continues to abuse drugs or alcohol she could act out impulsively causing serious harm to herself or others even though it may be unintentional.  She demonstrated understanding of this and has the capacity understand this    Patient is counseled her mental health treatment will be difficult to optimize with ongoing use of drugs or alcohol she demonstrated understanding of this and has the capacity to understand this     Patient is counseled that if she uses any amount of opiates after any clean time she could have an unintentional overdose she demonstrated understanding standing of this and has  the capacity understand this      Patient was given multiple drug counseling's including risk-taking situation she puts her self in regarding her substance abuse including the risk of harm to self or others even that would be unintentional including unintentional overdoses and she acknowledges all these risk states that she is aware of them.     Patient is counseled she must remain compliant with all medications outpatient follow-up appointments    Patient is discharged home in stable condition      TIME SPEND - 35 MINUTES TO COMPLETE THE EVALUATION, DISCHARGE SUMMARY, MEDICATION RECONCILIATION AND FOLLOW UP CARE     Signed:  MACHO Cortez - MARY  7/43/6881  1:55 PM

## 2022-07-19 NOTE — CARE COORDINATION
ARNOLDO met with pt to discuss her discharge. Pt reports feeling well today, denied SI/HI/AVH. Pt expressed feeling ready for discharge. Pt plans to go to the [facility]. Pt stated she can take the bus there. Pt plans to follow up with [facility] and is aware of her follow up appointments. Pt stated she does want to go to rehab in the future, she is just not ready at this time. ARNOLDO advised pt that if she changes her mind after discharge, ARNOLDO placed a list of 10 different substance abuse rehabs in his discharge follow up. Pt is aware of the risks she may face if she returns to substance use and or the lifestyle she was living prior to admission. Pt verbalized understanding of the risks but again stated she is not quite ready to change her life around. Pt has access to a safe place to stay, support from her dad, goals&hope for the future, outpatient provider, and communication skills. Pt is cooperative, pleasant, calm, with good eye contact, clear speech, and improved insight/judgement. ARNOLDO provided pt with a copy of her photo ID. ARNOLDO also provided pt with directions to the [facility].      In order to ensure appropriate transition and discharge planning is in place, the following documents have been transmitted to [facility], as the new outpatient provider:    The d/c diagnosis was transmitted to the next care provider  The reason for hospitalization was transmitted to the next care provider  The d/c medications (dosage and indication) were transmitted to the next care provider   The continuing care plan was transmitted to the next care provider

## 2022-07-19 NOTE — PROGRESS NOTES
CLINICAL PHARMACY NOTE: MEDS TO BEDS    Total # of Prescriptions Filled: 4   The following medications were delivered to the patient:   Thiamine mono 100 mg  Divalproex sodium  mg  Melatonin 3 mg  Folic acid 1 mg    Additional Documentation:     Delivered meds to DOMINGA freeman @1:45

## 2022-07-19 NOTE — PLAN OF CARE
Problem: Anxiety  Goal: Will report anxiety at manageable levels  Description: INTERVENTIONS:  1. Administer medication as ordered  2. Teach and rehearse alternative coping skills  3. Provide emotional support with 1:1 interaction with staff  7/18/2022 2105 by Tammi Sung RN  Outcome: Progressing Towards Goal     Problem: Coping  Goal: Pt/Family able to verbalize concerns and demonstrate effective coping strategies  Description: INTERVENTIONS:  1. Assist patient/family to identify coping skills, available support systems and cultural and spiritual values  2. Provide emotional support, including active listening and acknowledgement of concerns of patient and caregivers  3. Reduce environmental stimuli, as able  4. Instruct patient/family in relaxation techniques, as appropriate  5. Assess for spiritual pain/suffering and initiate Spiritual Care, Psychosocial Clinical Specialist consults as needed  7/18/2022 2105 by Tammi Sung RN  Outcome: Progressing Towards Goal     Patient has been withdrawn to her room resting. Affect is flat, mood is depressed. Responses were minimal. Rates anxiety and depression both 8/10 and the reasoning is \"Im not certain\". Denies suicidal/homicidal ideations and hallucinations at this time. Purposeful rounding continued.

## 2023-10-17 ENCOUNTER — ANESTHESIA EVENT (OUTPATIENT)
Dept: OPERATING ROOM | Age: 38
End: 2023-10-17
Payer: COMMERCIAL

## 2023-10-17 NOTE — PROGRESS NOTES
[address] [facility] PRE-ADMISSION TESTING INSTRUCTIONS    The Preadmission Testing patient is instructed accordingly using the following criteria (check applicable):    ARRIVAL INSTRUCTIONS:  [x] Parking the day of Surgery is located in the Main Entrance lot. Upon entering the door, make an immediate right to the surgery reception desk    [x] Bring photo ID and insurance card    [] Bring in a copy of Living will or Durable Power of  papers. [x] Please be sure to arrange for responsible adult to provide transportation to and from the hospital    [x] Please arrange for responsible adult to be with you for the 24 hour period post procedure due to having anesthesia    [x] If you awake am of surgery not feeling well or have temperature >100 please call [phone]    GENERAL INSTRUCTIONS:    [x] Nothing by mouth after midnight, including gum, candy, mints or water    [x] You may brush your teeth, but do not swallow any water    [x] Take medications as instructed with 1-2 oz of water    [x] Stop herbal supplements and vitamins 5 days prior to procedure    [] Follow preop dosing of blood thinners per physician instructions    [] Take 1/2 dose of evening insulin, but no insulin after midnight    [] No oral diabetic medications after midnight    [] If diabetic and have low blood sugar or feel symptomatic, take 1-2oz apple juice only    [] Bring inhalers day of surgery    [] Bring C-PAP/ Bi-Pap day of surgery    [x] Bring urine specimen day of surgery    [x] Shower or bath with soap, lather and rinse well, AM of Surgery, no lotion, powders or creams to surgical site    [] Follow bowel prep as instructed per surgeon    [x] No tobacco products within 24 hours of surgery     [x] No alcohol or illegal drug use within 24 hours of surgery.     [x] Jewelry, body piercing's, eyeglasses, contact lenses and dentures are not permitted into surgery (bring cases)      [x] Please do not wear any nail polish,

## 2023-10-18 ENCOUNTER — ANESTHESIA (OUTPATIENT)
Dept: OPERATING ROOM | Age: 38
End: 2023-10-18
Payer: COMMERCIAL

## 2023-10-18 ENCOUNTER — HOSPITAL ENCOUNTER (OUTPATIENT)
Age: 38
Setting detail: OUTPATIENT SURGERY
Discharge: HOME | End: 2023-10-18
Attending: OBSTETRICS & GYNECOLOGY | Admitting: OBSTETRICS & GYNECOLOGY
Payer: COMMERCIAL

## 2023-10-18 VITALS
HEIGHT: 68 IN | OXYGEN SATURATION: 100 % | BODY MASS INDEX: 42.44 KG/M2 | WEIGHT: 280 LBS | RESPIRATION RATE: 16 BRPM | SYSTOLIC BLOOD PRESSURE: 161 MMHG | TEMPERATURE: 97 F | DIASTOLIC BLOOD PRESSURE: 77 MMHG | HEART RATE: 73 BPM

## 2023-10-18 DIAGNOSIS — D06.9 CARCINOMA IN SITU OF CERVIX, UNSPECIFIED LOCATION: ICD-10-CM

## 2023-10-18 LAB
ABO + RH BLD: NORMAL
AMPHET UR QL SCN: NEGATIVE
ARM BAND NUMBER: NORMAL
BACTERIA URNS QL MICRO: ABNORMAL
BARBITURATES UR QL SCN: NEGATIVE
BENZODIAZ UR QL: NEGATIVE
BILIRUB UR QL STRIP: ABNORMAL
BLOOD BANK SAMPLE EXPIRATION: NORMAL
BLOOD GROUP ANTIBODIES SERPL: NEGATIVE
BUPRENORPHINE UR QL: NEGATIVE
CANNABINOIDS UR QL SCN: NEGATIVE
CLARITY UR: ABNORMAL
COCAINE UR QL SCN: NEGATIVE
COLOR UR: ABNORMAL
EKG ATRIAL RATE: 76 BPM
EKG P AXIS: 36 DEGREES
EKG P-R INTERVAL: 158 MS
EKG Q-T INTERVAL: 414 MS
EKG QRS DURATION: 88 MS
EKG QTC CALCULATION (BAZETT): 465 MS
EKG R AXIS: 23 DEGREES
EKG T AXIS: 44 DEGREES
EKG VENTRICULAR RATE: 76 BPM
EPI CELLS #/AREA URNS HPF: ABNORMAL /HPF
ERYTHROCYTE [DISTWIDTH] IN BLOOD BY AUTOMATED COUNT: 14.9 % (ref 11.5–15)
FENTANYL UR QL: NEGATIVE
GLUCOSE UR STRIP-MCNC: NEGATIVE MG/DL
HCG, URINE, POC: NEGATIVE
HCT VFR BLD AUTO: 31.1 % (ref 34–48)
HGB BLD-MCNC: 9.8 G/DL (ref 11.5–15.5)
HGB UR QL STRIP.AUTO: ABNORMAL
KETONES UR STRIP-MCNC: ABNORMAL MG/DL
LEUKOCYTE ESTERASE UR QL STRIP: ABNORMAL
Lab: NORMAL
MCH RBC QN AUTO: 19.4 PG (ref 26–35)
MCHC RBC AUTO-ENTMCNC: 31.5 G/DL (ref 32–34.5)
MCV RBC AUTO: 61.6 FL (ref 80–99.9)
METHADONE UR QL: NEGATIVE
NEGATIVE QC PASS/FAIL: NORMAL
NITRITE UR QL STRIP: POSITIVE
OPIATES UR QL SCN: NEGATIVE
OXYCODONE UR QL SCN: NEGATIVE
PCP UR QL SCN: NEGATIVE
PH UR STRIP: 5.5 [PH] (ref 5–9)
PLATELET # BLD AUTO: 420 K/UL (ref 130–450)
PMV BLD AUTO: 9.9 FL (ref 7–12)
POSITIVE QC PASS/FAIL: NORMAL
PROT UR STRIP-MCNC: 100 MG/DL
RBC # BLD AUTO: 5.05 M/UL (ref 3.5–5.5)
RBC #/AREA URNS HPF: ABNORMAL /HPF
SP GR UR STRIP: >1.03 (ref 1–1.03)
TEST INFORMATION: NORMAL
UROBILINOGEN UR STRIP-ACNC: 1 EU/DL (ref 0–1)
WBC #/AREA URNS HPF: ABNORMAL /HPF
WBC OTHER # BLD: 3.9 K/UL (ref 4.5–11.5)

## 2023-10-18 PROCEDURE — 7100000001 HC PACU RECOVERY - ADDTL 15 MIN: Performed by: OBSTETRICS & GYNECOLOGY

## 2023-10-18 PROCEDURE — 3600000002 HC SURGERY LEVEL 2 BASE: Performed by: OBSTETRICS & GYNECOLOGY

## 2023-10-18 PROCEDURE — 6370000000 HC RX 637 (ALT 250 FOR IP): Performed by: OBSTETRICS & GYNECOLOGY

## 2023-10-18 PROCEDURE — 3600000012 HC SURGERY LEVEL 2 ADDTL 15MIN: Performed by: OBSTETRICS & GYNECOLOGY

## 2023-10-18 PROCEDURE — 80307 DRUG TEST PRSMV CHEM ANLYZR: CPT

## 2023-10-18 PROCEDURE — 93005 ELECTROCARDIOGRAM TRACING: CPT | Performed by: ANESTHESIOLOGY

## 2023-10-18 PROCEDURE — 86901 BLOOD TYPING SEROLOGIC RH(D): CPT

## 2023-10-18 PROCEDURE — 85027 COMPLETE CBC AUTOMATED: CPT

## 2023-10-18 PROCEDURE — 88307 TISSUE EXAM BY PATHOLOGIST: CPT

## 2023-10-18 PROCEDURE — 93010 ELECTROCARDIOGRAM REPORT: CPT | Performed by: INTERNAL MEDICINE

## 2023-10-18 PROCEDURE — 6360000002 HC RX W HCPCS

## 2023-10-18 PROCEDURE — 81001 URINALYSIS AUTO W/SCOPE: CPT

## 2023-10-18 PROCEDURE — 3700000000 HC ANESTHESIA ATTENDED CARE: Performed by: OBSTETRICS & GYNECOLOGY

## 2023-10-18 PROCEDURE — 2709999900 HC NON-CHARGEABLE SUPPLY: Performed by: OBSTETRICS & GYNECOLOGY

## 2023-10-18 PROCEDURE — 7100000000 HC PACU RECOVERY - FIRST 15 MIN: Performed by: OBSTETRICS & GYNECOLOGY

## 2023-10-18 PROCEDURE — 2500000003 HC RX 250 WO HCPCS

## 2023-10-18 PROCEDURE — 3700000001 HC ADD 15 MINUTES (ANESTHESIA): Performed by: OBSTETRICS & GYNECOLOGY

## 2023-10-18 PROCEDURE — 2500000003 HC RX 250 WO HCPCS: Performed by: OBSTETRICS & GYNECOLOGY

## 2023-10-18 PROCEDURE — 7100000010 HC PHASE II RECOVERY - FIRST 15 MIN: Performed by: OBSTETRICS & GYNECOLOGY

## 2023-10-18 PROCEDURE — 86900 BLOOD TYPING SEROLOGIC ABO: CPT

## 2023-10-18 PROCEDURE — 86850 RBC ANTIBODY SCREEN: CPT

## 2023-10-18 PROCEDURE — 7100000011 HC PHASE II RECOVERY - ADDTL 15 MIN: Performed by: OBSTETRICS & GYNECOLOGY

## 2023-10-18 PROCEDURE — 2580000003 HC RX 258

## 2023-10-18 RX ORDER — SODIUM CHLORIDE 0.9 % (FLUSH) 0.9 %
5-40 SYRINGE (ML) INJECTION EVERY 12 HOURS SCHEDULED
Status: DISCONTINUED | OUTPATIENT
Start: 2023-10-18 | End: 2023-10-18 | Stop reason: HOSPADM

## 2023-10-18 RX ORDER — FERRIC SUBSULFATE 0.21 G/G
LIQUID TOPICAL PRN
Status: DISCONTINUED | OUTPATIENT
Start: 2023-10-18 | End: 2023-10-18 | Stop reason: ALTCHOICE

## 2023-10-18 RX ORDER — FENTANYL CITRATE 50 UG/ML
INJECTION, SOLUTION INTRAMUSCULAR; INTRAVENOUS PRN
Status: DISCONTINUED | OUTPATIENT
Start: 2023-10-18 | End: 2023-10-18 | Stop reason: SDUPTHER

## 2023-10-18 RX ORDER — DIPHENHYDRAMINE HYDROCHLORIDE 50 MG/ML
INJECTION INTRAMUSCULAR; INTRAVENOUS PRN
Status: DISCONTINUED | OUTPATIENT
Start: 2023-10-18 | End: 2023-10-18 | Stop reason: SDUPTHER

## 2023-10-18 RX ORDER — PROPOFOL 10 MG/ML
INJECTION, EMULSION INTRAVENOUS PRN
Status: DISCONTINUED | OUTPATIENT
Start: 2023-10-18 | End: 2023-10-18 | Stop reason: SDUPTHER

## 2023-10-18 RX ORDER — LIDOCAINE HYDROCHLORIDE 20 MG/ML
INJECTION, SOLUTION EPIDURAL; INFILTRATION; INTRACAUDAL; PERINEURAL PRN
Status: DISCONTINUED | OUTPATIENT
Start: 2023-10-18 | End: 2023-10-18 | Stop reason: SDUPTHER

## 2023-10-18 RX ORDER — SODIUM CHLORIDE 9 MG/ML
INJECTION, SOLUTION INTRAVENOUS PRN
Status: DISCONTINUED | OUTPATIENT
Start: 2023-10-18 | End: 2023-10-18 | Stop reason: HOSPADM

## 2023-10-18 RX ORDER — ACETAMINOPHEN 500 MG
1000 TABLET ORAL EVERY 8 HOURS PRN
Status: DISCONTINUED | OUTPATIENT
Start: 2023-10-18 | End: 2023-10-18 | Stop reason: HOSPADM

## 2023-10-18 RX ORDER — IBUPROFEN 800 MG/1
800 TABLET ORAL EVERY 8 HOURS PRN
Status: DISCONTINUED | OUTPATIENT
Start: 2023-10-20 | End: 2023-10-18 | Stop reason: HOSPADM

## 2023-10-18 RX ORDER — ONDANSETRON 4 MG/1
4 TABLET, ORALLY DISINTEGRATING ORAL EVERY 8 HOURS PRN
Status: DISCONTINUED | OUTPATIENT
Start: 2023-10-18 | End: 2023-10-18 | Stop reason: HOSPADM

## 2023-10-18 RX ORDER — SODIUM CHLORIDE 9 MG/ML
INJECTION, SOLUTION INTRAVENOUS CONTINUOUS PRN
Status: DISCONTINUED | OUTPATIENT
Start: 2023-10-18 | End: 2023-10-18 | Stop reason: SDUPTHER

## 2023-10-18 RX ORDER — ONDANSETRON 2 MG/ML
4 INJECTION INTRAMUSCULAR; INTRAVENOUS EVERY 6 HOURS PRN
Status: DISCONTINUED | OUTPATIENT
Start: 2023-10-18 | End: 2023-10-18 | Stop reason: HOSPADM

## 2023-10-18 RX ORDER — SODIUM CHLORIDE, SODIUM LACTATE, POTASSIUM CHLORIDE, CALCIUM CHLORIDE 600; 310; 30; 20 MG/100ML; MG/100ML; MG/100ML; MG/100ML
INJECTION, SOLUTION INTRAVENOUS CONTINUOUS
Status: DISCONTINUED | OUTPATIENT
Start: 2023-10-18 | End: 2023-10-18 | Stop reason: HOSPADM

## 2023-10-18 RX ORDER — DEXAMETHASONE SODIUM PHOSPHATE 4 MG/ML
INJECTION, SOLUTION INTRA-ARTICULAR; INTRALESIONAL; INTRAMUSCULAR; INTRAVENOUS; SOFT TISSUE PRN
Status: DISCONTINUED | OUTPATIENT
Start: 2023-10-18 | End: 2023-10-18 | Stop reason: SDUPTHER

## 2023-10-18 RX ORDER — LIDOCAINE HYDROCHLORIDE AND EPINEPHRINE 10; 10 MG/ML; UG/ML
INJECTION, SOLUTION INFILTRATION; PERINEURAL PRN
Status: DISCONTINUED | OUTPATIENT
Start: 2023-10-18 | End: 2023-10-18 | Stop reason: ALTCHOICE

## 2023-10-18 RX ORDER — KETOROLAC TROMETHAMINE 30 MG/ML
30 INJECTION, SOLUTION INTRAMUSCULAR; INTRAVENOUS EVERY 6 HOURS
Status: DISCONTINUED | OUTPATIENT
Start: 2023-10-18 | End: 2023-10-18 | Stop reason: HOSPADM

## 2023-10-18 RX ORDER — ONDANSETRON 2 MG/ML
INJECTION INTRAMUSCULAR; INTRAVENOUS PRN
Status: DISCONTINUED | OUTPATIENT
Start: 2023-10-18 | End: 2023-10-18 | Stop reason: SDUPTHER

## 2023-10-18 RX ORDER — SODIUM CHLORIDE 0.9 % (FLUSH) 0.9 %
5-40 SYRINGE (ML) INJECTION PRN
Status: DISCONTINUED | OUTPATIENT
Start: 2023-10-18 | End: 2023-10-18 | Stop reason: HOSPADM

## 2023-10-18 RX ORDER — MIDAZOLAM HYDROCHLORIDE 1 MG/ML
INJECTION INTRAMUSCULAR; INTRAVENOUS PRN
Status: DISCONTINUED | OUTPATIENT
Start: 2023-10-18 | End: 2023-10-18 | Stop reason: SDUPTHER

## 2023-10-18 RX ORDER — IBUPROFEN 800 MG/1
800 TABLET ORAL
Qty: 90 TABLET | Refills: 1 | Status: SHIPPED | OUTPATIENT
Start: 2023-10-18

## 2023-10-18 RX ORDER — IODINE SOLUTION STRONG 5% (LUGOL'S) 5 %
SOLUTION ORAL PRN
Status: DISCONTINUED | OUTPATIENT
Start: 2023-10-18 | End: 2023-10-18 | Stop reason: ALTCHOICE

## 2023-10-18 RX ORDER — KETOROLAC TROMETHAMINE 30 MG/ML
INJECTION, SOLUTION INTRAMUSCULAR; INTRAVENOUS PRN
Status: DISCONTINUED | OUTPATIENT
Start: 2023-10-18 | End: 2023-10-18 | Stop reason: SDUPTHER

## 2023-10-18 RX ADMIN — PROPOFOL 200 MG: 10 INJECTION, EMULSION INTRAVENOUS at 12:18

## 2023-10-18 RX ADMIN — SODIUM CHLORIDE: 9 INJECTION, SOLUTION INTRAVENOUS at 12:14

## 2023-10-18 RX ADMIN — DIPHENHYDRAMINE HYDROCHLORIDE 25 MG: 50 INJECTION INTRAMUSCULAR; INTRAVENOUS at 12:21

## 2023-10-18 RX ADMIN — DEXAMETHASONE SODIUM PHOSPHATE 10 MG: 4 INJECTION, SOLUTION INTRAMUSCULAR; INTRAVENOUS at 12:23

## 2023-10-18 RX ADMIN — MIDAZOLAM 2 MG: 1 INJECTION INTRAMUSCULAR; INTRAVENOUS at 12:14

## 2023-10-18 RX ADMIN — KETOROLAC TROMETHAMINE 30 MG: 30 INJECTION, SOLUTION INTRAMUSCULAR; INTRAVENOUS at 12:52

## 2023-10-18 RX ADMIN — ONDANSETRON 4 MG: 2 INJECTION INTRAMUSCULAR; INTRAVENOUS at 12:23

## 2023-10-18 RX ADMIN — LIDOCAINE HYDROCHLORIDE 100 MG: 20 INJECTION, SOLUTION EPIDURAL; INFILTRATION; INTRACAUDAL; PERINEURAL at 12:18

## 2023-10-18 RX ADMIN — FENTANYL CITRATE 50 MCG: 50 INJECTION, SOLUTION INTRAMUSCULAR; INTRAVENOUS at 12:18

## 2023-10-18 RX ADMIN — FENTANYL CITRATE 50 MCG: 50 INJECTION, SOLUTION INTRAMUSCULAR; INTRAVENOUS at 12:44

## 2023-10-18 ASSESSMENT — PAIN SCALES - GENERAL
PAINLEVEL_OUTOF10: 0

## 2023-10-18 ASSESSMENT — LIFESTYLE VARIABLES: SMOKING_STATUS: 1

## 2023-10-18 ASSESSMENT — ENCOUNTER SYMPTOMS: SHORTNESS OF BREATH: 1

## 2023-10-18 ASSESSMENT — PAIN - FUNCTIONAL ASSESSMENT: PAIN_FUNCTIONAL_ASSESSMENT: NONE - DENIES PAIN

## 2023-10-18 NOTE — H&P
[address]                  [address] [facility], [address] [place] [address]                       PREOPERATIVE HISTORY AND PHYSICAL    PATIENT NAME: Harpreet Alan                    :        1985  MED REC NO:   [id number]                            ROOM:  ACCOUNT NO:   [de-identified]                           ADMIT DATE: 10/18/2023  PROVIDER:     Siena Shaffer MD    CHIEF COMPLAINT:  Abnormal Pap smear. HISTORY OF PRESENT ILLNESS:  The patient is a 28-year-old female,   3, para 2-0-1-2. The last normal menstrual period of  2023. The patient presents after being referred from [facility], where she had a Pap smear done followed by colposcopy with  results coming back showing carcinoma in situ of the cervix. Therefore,  the patient presents for definitive therapy of a LEEP procedure. On  admission, she denies vaginal discharge, urinary symptoms, or fever or  chills. PAST MEDICAL HISTORY:  Significant for two prior  sections and  one pregnancy termination. Also significant for depression. MEDICATIONS:  Prozac, Seroquel, and doxepin. ALLERGIES:  None. FAMILY HISTORY:  Noncontributory. REVIEW OF SYSTEMS:  Noncontributory. PHYSICAL EXAMINATION:  VITAL SIGNS:  Temperature of 97.8, pulse 78, respirations 13, blood  pressure 135/84. GENERAL APPEARANCE:  Alert and oriented without any distress. HEENT:  Normocephalic and atraumatic. Pupils are equal, reactive, and  accommodating to light. Random ocular muscles are intact. NECK:  Supple and full without lymphadenopathy present. CARDIOVASCULAR:  Regular rate and rhythm without murmurs noted. BREASTS:  Soft and nontender without masses. LUNGS:  Clear without wheeze or crackle. ABDOMEN:  Soft and nontender. PELVIC:  Normal female external genitalia. On bimanual examination, no  cervical motion tenderness. Uterus nonpregnant size.   No adnexal masses  were noted.  EXTREMITIES:  No calf tenderness. No edema. ASSESSMENT:  Carcinoma in situ of the cervix. PLAN:  The patient is to have a LEEP procedure.         Mary Jamil MD    D: 10/17/2023 16:51:57       T: 10/17/2023 22:03:38     [email or web address]  Job#: [id number]     Doc#: [id number]

## 2023-10-18 NOTE — ANESTHESIA POSTPROCEDURE EVALUATION
Department of Anesthesiology  Postprocedure Note    Patient: Awilda Hayes  MRN: [id number]  YOB: 1985  Date of evaluation: 10/18/2023      Procedure Summary     Date: 10/18/23 Room / Location: [facility] OR 07 / [facility]    Anesthesia Start: 1214 Anesthesia Stop: 4578    Procedure: LEEP (Vagina ) Diagnosis:       Carcinoma in situ of cervix, unspecified location      (Carcinoma in situ of cervix, unspecified location [D06.9])    Surgeons: Ana Keller MD Responsible Provider: Rojas Dorantes DO    Anesthesia Type: General ASA Status: 3          Anesthesia Type: General    Bar Phase I: Bar Score: 8    Bar Phase II:        Anesthesia Post Evaluation    Patient location during evaluation: PACU  Patient participation: complete - patient participated  Level of consciousness: awake and alert  Nausea & Vomiting: no vomiting and no nausea  Complications: no  Cardiovascular status: hemodynamically stable  Respiratory status: acceptable and spontaneous ventilation  Hydration status: stable  Pain management: adequate

## 2023-10-19 NOTE — OP NOTE
[address]                  [address]                                OPERATIVE REPORT    PATIENT NAME: Finesse Layton                    :        1985  MED REC NO:   [id number]                            ROOM:  ACCOUNT NO:   [de-identified]                           ADMIT DATE: 10/18/2023  PROVIDER:     Cory Yan MD    DATE OF PROCEDURE:  10/18/2023    PREOPERATIVE DIAGNOSIS:  Carcinoma in situ of the cervix. POSTOPERATIVE DIAGNOSIS:  Carcinoma in situ of the cervix, pathology  report pending. PROCEDURE PERFORMED:  LEEP. SURGEON:  Bladimir Simms. MD Leoncio.    ANESTHESIA:  General.    DESCRIPTION OF PROCEDURE:  The patient was taken to the operating room. She was prepped and draped in the dorsal lithotomy position. Prior to  that, the patient was given general anesthesia. Next, we proceeded with  emptying the bladder by an in-and-out catheter and then proceeded with  placement of the coated bivalve speculum into the vaginal vault. This  was then followed by placement of the lateral vaginal retractors that  were also coated into the vaginal vault. Next, the cervix was  identified, then painted with Lugol's solution to allow for the  non-staining portions to appear. Following this, a total of 1%  lidocaine with epinephrine was infiltrated, total of 10 mL, at 12, 3, 6,  and 9 o'clock positions. After allowing that to take effect, we  proceeded with excising out the non-staining portions of the cervix  using the LEEP device. This was done in one pass. Following this,  specimen was handed off to be sent to Pathology and the 12 o'clock  position was tagged. Following this, we proceeded with cauterizing the  cervical bed. Once this was done, procedure was complete. Next, we  proceeded with removal of the lateral vaginal retractors, then the  bivalve speculum.   The patient was then taken out of the dorsal  lithotomy position and placed in the dorsal supine position to be  transferred to recovery room. FINDINGS OF THE PROCEDURE:  As described in operative report. ESTIMATED BLOOD LOSS:  Less than 10 mL. COMPLICATIONS:  None. DISPOSITION:  The patient tolerated the procedure well and was  transferred to the recovery room, then was to be discharged to home when  awake, alert, and stable.         Collette Garret, MD    D: 10/18/2023 12:59:44       T: 10/18/2023 16:04:12     [email or web address]  Job#: [id number]     Doc#: [id number]    CC:

## 2023-10-23 LAB — SURGICAL PATHOLOGY REPORT: NORMAL

## 2023-12-27 ENCOUNTER — TELEPHONE (OUTPATIENT)
Dept: SLEEP CENTER | Age: 38
End: 2023-12-27

## 2024-01-02 ENCOUNTER — TELEPHONE (OUTPATIENT)
Dept: SLEEP CENTER | Age: 39
End: 2024-01-02

## 2024-01-12 ENCOUNTER — HOSPITAL ENCOUNTER (OUTPATIENT)
Dept: SLEEP CENTER | Age: 39
Discharge: HOME | End: 2024-01-12

## 2024-01-12 DIAGNOSIS — G47.33 OSA (OBSTRUCTIVE SLEEP APNEA): Primary | ICD-10-CM

## 2024-01-23 ENCOUNTER — TELEPHONE (OUTPATIENT)
Dept: SLEEP CENTER | Age: 39
End: 2024-01-23

## 2024-01-23 NOTE — TELEPHONE ENCOUNTER
Call to pt father answered the phone . He will have the pt call the office back to schedule an appt to discuss SS results

## 2024-05-13 NOTE — ED NOTES
Progress Note - General Surgery   Cheryle Mcleod 80 y.o. female MRN: [id number]  Unit/Bed#: -01 Encounter: [id number]    Assessment:  79 yo F with transaminitis, cholelithiasis with E. Coli bacteremia    - Afebrile, episodes of HTN, stable on 1-2 L O2 overnight  - WBC 6 (6)  - Hgb 11 (11)  - RONALD resolved - 0.88 (0.89, 1.24)  - Transaminitis improving - AST 73 (96, 130),  (145, 184), Alk Phos 298 (274, 215)  - Tbili 2.60 (2.77, 3.10)    - 1/2 blood cx: E coli  - Urine cx: 80-89k E coli    - MRCP: numerous small gallstones in the gallbladder. No evidence of biliary ductal dilatation or choledocholithiasis.    - Pmhx: CAD s/p MI, COPD(no home 02), RA, fatty liver   - Pshx: hyster, bladder suspension     Plan:  - Continue diet as tolerated, low fat, patient has less of an appetite today but states usually she eats several small meals and not 3 big meals  - On Ceftriaxone for UTI, E.coli bacteremia   - LFTs, Tbili down-trending - continue to follow another day while monitoring diet tolerance  - F/u final blood cx results  - Wean o2 as able  - Tbili has previously been in the 1.0 - 1.7 range on outpatient labs, as low as 0.4, there is no acute need for surgical intervention as patient has no signs of acute cholecystitis. Patient states she has no interest in undergoing surgery for the gallbladder if able to be avoided  - Rest of care per primary    Subjective:   Patient states she had a rough night.  She explains she woke up sweating and was having some issues with her eyes.  She states nursing assisted with this and she feels much better at this time.  She denies any issues with abdominal pain, nausea, or vomiting.  Patient has eaten minimal breakfast and states she just does not feel hungry at this time.  She does not equate this to pain and states any discomfort she had in the right upper quadrant is completely resolved.  Patient does comment that typically at baseline she eats more small, frequent  Pt referred to [address] for discussion with on-call re: admission 59 LINDA Neil, [facility]  07/13/22 7633 meals as opposed to 3 large meals.  Will continue to monitor diet intake and LFTs/T. bili.    Objective:   Blood pressure 160/80, pulse 72, temperature (!) 97.2 °F (36.2 °C), resp. rate 18, height 5' (1.524 m), weight 57.8 kg (127 lb 6.8 oz), SpO2 98%.,Body mass index is 24.89 kg/m².    Intake/Output Summary (Last 24 hours) at 5/13/2024 0759  Last data filed at 5/12/2024 1700  Gross per 24 hour   Intake 480 ml   Output 600 ml   Net -120 ml     Invasive Devices       Peripheral Intravenous Line  Duration             Peripheral IV 05/13/24 Dorsal (posterior);Right Forearm <1 day              Drain  Duration             External Urinary Catheter 3 days                  Physical Exam:   General: no acute distress, pt appears well and comfortable, sitting awake in bed working on breakfast tray, watching TV  Skin: warm and dry to touch  Pulmonary: normal effort, with nasal cannula in place  Abdominal: Soft, nondistended, completely nontender to palpation of abdomen including right upper quadrant, no guarding or rebound   Neuro: alert and oriented     Lab, Imaging and other studies:I have personally reviewed pertinent lab results.    CBC:   Lab Results   Component Value Date    WBC 6.40 05/13/2024    HGB 11.4 (L) 05/13/2024    HCT 35.7 05/13/2024     (H) 05/13/2024     05/13/2024    RBC 3.57 (L) 05/13/2024    MCH 31.9 05/13/2024    MCHC 31.9 05/13/2024    RDW 14.9 05/13/2024    MPV 9.7 05/13/2024    NRBC 0 05/13/2024   CMP:   Lab Results   Component Value Date    SODIUM 141 05/13/2024    K 4.0 05/13/2024     (H) 05/13/2024    CO2 23 05/13/2024    BUN 11 05/13/2024    CREATININE 0.88 05/13/2024    CALCIUM 8.7 05/13/2024    AST 73 (H) 05/13/2024     (H) 05/13/2024    ALKPHOS 298 (H) 05/13/2024    EGFR 61 05/13/2024     VTE Pharmacologic Prophylaxis: Heparin  VTE Mechanical Prophylaxis: sequential compression device    Katrina Elizabeth Billig, PA-C  5/13/2024

## 2024-10-07 ENCOUNTER — HOSPITAL ENCOUNTER (EMERGENCY)
Age: 39
Discharge: HOME | End: 2024-10-08
Payer: COMMERCIAL

## 2024-10-07 VITALS
RESPIRATION RATE: 12 BRPM | SYSTOLIC BLOOD PRESSURE: 122 MMHG | DIASTOLIC BLOOD PRESSURE: 70 MMHG | WEIGHT: 256 LBS | HEIGHT: 67 IN | HEART RATE: 58 BPM | TEMPERATURE: 97.5 F | OXYGEN SATURATION: 99 % | BODY MASS INDEX: 40.18 KG/M2

## 2024-10-07 DIAGNOSIS — N30.01 ACUTE CYSTITIS WITH HEMATURIA: ICD-10-CM

## 2024-10-07 DIAGNOSIS — R07.89 CHEST WALL PAIN: Primary | ICD-10-CM

## 2024-10-07 LAB
ALBUMIN SERPL-MCNC: 3.7 G/DL (ref 3.5–5.2)
ALP SERPL-CCNC: 88 U/L (ref 35–104)
ALT SERPL-CCNC: 18 U/L (ref 0–32)
ANION GAP SERPL CALCULATED.3IONS-SCNC: 12 MMOL/L (ref 7–16)
AST SERPL-CCNC: 19 U/L (ref 0–31)
BASOPHILS # BLD: 0.02 K/UL (ref 0–0.2)
BASOPHILS NFR BLD: 0 % (ref 0–2)
BILIRUB SERPL-MCNC: 0.5 MG/DL (ref 0–1.2)
BILIRUB UR QL STRIP: ABNORMAL
BUN SERPL-MCNC: 12 MG/DL (ref 6–20)
CALCIUM SERPL-MCNC: 9 MG/DL (ref 8.6–10.2)
CHLORIDE SERPL-SCNC: 101 MMOL/L (ref 98–107)
CLARITY UR: CLEAR
CO2 SERPL-SCNC: 23 MMOL/L (ref 22–29)
COLOR UR: YELLOW
CREAT SERPL-MCNC: 0.6 MG/DL (ref 0.5–1)
D-DIMER QUANTITATIVE: 450 NG/ML DDU (ref 0–230)
EOSINOPHIL # BLD: 0.15 K/UL (ref 0.05–0.5)
EOSINOPHILS RELATIVE PERCENT: 2 % (ref 0–6)
EPI CELLS #/AREA URNS HPF: ABNORMAL /HPF
ERYTHROCYTE [DISTWIDTH] IN BLOOD BY AUTOMATED COUNT: 15.8 % (ref 11.5–15)
GFR, ESTIMATED: >90 ML/MIN/1.73M2
GLUCOSE SERPL-MCNC: 122 MG/DL (ref 74–99)
GLUCOSE UR STRIP-MCNC: NEGATIVE MG/DL
HCT VFR BLD AUTO: 30.5 % (ref 34–48)
HGB BLD-MCNC: 9.8 G/DL (ref 11.5–15.5)
HGB UR QL STRIP.AUTO: ABNORMAL
IMM GRANULOCYTES # BLD AUTO: <0.03 K/UL (ref 0–0.58)
IMM GRANULOCYTES NFR BLD: 0 % (ref 0–5)
KETONES UR STRIP-MCNC: ABNORMAL MG/DL
LEUKOCYTE ESTERASE UR QL STRIP: NEGATIVE
LYMPHOCYTES NFR BLD: 2.5 K/UL (ref 1.5–4)
LYMPHOCYTES RELATIVE PERCENT: 39 % (ref 20–42)
MCH RBC QN AUTO: 19.7 PG (ref 26–35)
MCHC RBC AUTO-ENTMCNC: 32.1 G/DL (ref 32–34.5)
MCV RBC AUTO: 61.4 FL (ref 80–99.9)
MONOCYTES NFR BLD: 0.58 K/UL (ref 0.1–0.95)
MONOCYTES NFR BLD: 9 % (ref 2–12)
MUCOUS THREADS URNS QL MICRO: PRESENT
NEUTROPHILS NFR BLD: 49 % (ref 43–80)
NEUTS SEG NFR BLD: 3.12 K/UL (ref 1.8–7.3)
NITRITE UR QL STRIP: NEGATIVE
PH UR STRIP: 5.5 [PH] (ref 5–9)
PLATELET # BLD AUTO: 324 K/UL (ref 130–450)
PMV BLD AUTO: 10.3 FL (ref 7–12)
POTASSIUM SERPL-SCNC: 3.4 MMOL/L (ref 3.5–5)
PROT SERPL-MCNC: 7.1 G/DL (ref 6.4–8.3)
PROT UR STRIP-MCNC: 30 MG/DL
RBC # BLD AUTO: 4.97 M/UL (ref 3.5–5.5)
RBC # BLD: ABNORMAL 10*6/UL
RBC #/AREA URNS HPF: ABNORMAL /HPF
SODIUM SERPL-SCNC: 136 MMOL/L (ref 132–146)
SP GR UR STRIP: >1.03 (ref 1–1.03)
TROPONIN I SERPL HS-MCNC: <6 NG/L (ref 0–9)
UROBILINOGEN UR STRIP-ACNC: 2 EU/DL (ref 0–1)
WBC #/AREA URNS HPF: ABNORMAL /HPF
WBC OTHER # BLD: 6.4 K/UL (ref 4.5–11.5)

## 2024-10-07 PROCEDURE — 81001 URINALYSIS AUTO W/SCOPE: CPT

## 2024-10-07 PROCEDURE — 6370000000 HC RX 637 (ALT 250 FOR IP): Performed by: PHYSICIAN ASSISTANT

## 2024-10-07 PROCEDURE — 87086 URINE CULTURE/COLONY COUNT: CPT

## 2024-10-07 PROCEDURE — 85379 FIBRIN DEGRADATION QUANT: CPT

## 2024-10-07 PROCEDURE — 84484 ASSAY OF TROPONIN QUANT: CPT

## 2024-10-07 PROCEDURE — 99283 EMERGENCY DEPT VISIT LOW MDM: CPT

## 2024-10-07 PROCEDURE — 80053 COMPREHEN METABOLIC PANEL: CPT

## 2024-10-07 PROCEDURE — 85025 COMPLETE CBC W/AUTO DIFF WBC: CPT

## 2024-10-07 RX ORDER — CEPHALEXIN 500 MG/1
500 CAPSULE ORAL 4 TIMES DAILY
Qty: 28 CAPSULE | Refills: 0 | Status: SHIPPED | OUTPATIENT
Start: 2024-10-07 | End: 2024-10-14

## 2024-10-07 RX ADMIN — POTASSIUM BICARBONATE 40 MEQ: 782 TABLET, EFFERVESCENT ORAL at 23:39

## 2024-10-07 ASSESSMENT — PAIN - FUNCTIONAL ASSESSMENT: PAIN_FUNCTIONAL_ASSESSMENT: 0-10

## 2024-10-07 ASSESSMENT — PAIN DESCRIPTION - FREQUENCY: FREQUENCY: CONTINUOUS

## 2024-10-07 ASSESSMENT — PAIN DESCRIPTION - PAIN TYPE: TYPE: ACUTE PAIN

## 2024-10-07 ASSESSMENT — PAIN DESCRIPTION - LOCATION: LOCATION: CHEST

## 2024-10-07 ASSESSMENT — PAIN DESCRIPTION - DESCRIPTORS: DESCRIPTORS: CRAMPING

## 2024-10-07 ASSESSMENT — PAIN DESCRIPTION - ORIENTATION: ORIENTATION: MID;ANTERIOR

## 2024-10-07 ASSESSMENT — LIFESTYLE VARIABLES
HOW OFTEN DO YOU HAVE A DRINK CONTAINING ALCOHOL: NEVER
HOW OFTEN DO YOU HAVE A DRINK CONTAINING ALCOHOL: NEVER
HOW MANY STANDARD DRINKS CONTAINING ALCOHOL DO YOU HAVE ON A TYPICAL DAY: PATIENT DOES NOT DRINK

## 2024-10-08 NOTE — ED PROVIDER NOTES
Independent KIM Visit..HPI:  10/7/24,   Time: 9:44 PM EDT         Tameka Argueta is a 39 y.o. female presenting to the ED by private vehicle for chest pain and shortness of breath.  Her symptoms started 3 days ago.  She states the chest pain is a heaviness feeling that is constant.  She also complains of intermittent left arm numbness and tingling.  Comes and goes.  She has never had this before.  She does not have a history of heart or lung problems.  Does not take any medication.  She is currently 10 weeks pregnant, her OB/GYN is Dr. Tucker.  She also reports dysuria that started today and think she has a UTI.  She denies fever, chills, body aches, headache, dizziness, upper respiratory symptoms, cough, hemoptysis, abdominal pain, nausea, vomiting, diarrhea, pelvic cramping or vaginal bleeding.    ROS:   Pertinent positives and negatives are stated within HPI, all other systems reviewed and are negative.  --------------------------------------------- PAST HISTORY ---------------------------------------------  Past Medical History:  has a past medical history of Abnormal Pap smear of cervix, Anxious depression, GERD (gastroesophageal reflux disease), and Hypertension.    Past Surgical History:  has a past surgical history that includes ovarian cyst removal;  section; Ankle fracture surgery (Right, 2022); hernia repair; fracture surgery; and LEEP (N/A, 10/18/2023).    Social History:  reports that she has been smoking cigarettes. She has a 5 pack-year smoking history. She has never used smokeless tobacco. She reports current alcohol use. She reports that she does not currently use drugs after having used the following drugs: Cocaine and Marijuana (Weed).    Family History: family history includes Substance Abuse in her father, mother, and sister.     The patient’s home medications have been reviewed.    Allergies: Patient has no known allergies.    --------------------------------------------------

## 2024-10-09 LAB
MICROORGANISM SPEC CULT: NO GROWTH
SERVICE CMNT-IMP: NORMAL
SPECIMEN DESCRIPTION: NORMAL

## 2025-03-12 ENCOUNTER — HOSPITAL ENCOUNTER (OUTPATIENT)
Age: 40
Discharge: HOME | End: 2025-03-12
Payer: COMMERCIAL

## 2025-03-12 LAB
AMOUNT GLUCOSE GIVEN: 50 G
COLLECT TIME, 1HR GLUCOSE: NORMAL
ERYTHROCYTE [DISTWIDTH] IN BLOOD BY AUTOMATED COUNT: 15.2 % (ref 11.5–15)
GLUCOSE 3H P 100 G GLC PO SERPL-MCNC: 176 MG/DL
HCT VFR BLD AUTO: 31.6 % (ref 34–48)
HGB BLD-MCNC: 9.7 G/DL (ref 11.5–15.5)
MCH RBC QN AUTO: 20.4 PG (ref 26–35)
MCHC RBC AUTO-ENTMCNC: 30.7 G/DL (ref 32–34.5)
MCV RBC AUTO: 66.5 FL (ref 80–99.9)
PLATELET # BLD AUTO: 307 K/UL (ref 130–450)
PMV BLD AUTO: 9.9 FL (ref 7–12)
RBC # BLD AUTO: 4.75 M/UL (ref 3.5–5.5)
WBC OTHER # BLD: 6.8 K/UL (ref 4.5–11.5)

## 2025-03-12 PROCEDURE — 36415 COLL VENOUS BLD VENIPUNCTURE: CPT

## 2025-03-12 PROCEDURE — 82950 GLUCOSE TEST: CPT

## 2025-03-12 PROCEDURE — 85027 COMPLETE CBC AUTOMATED: CPT

## 2025-04-11 LAB
C TRACH DNA SPEC QL PROBE+SIG AMP: NEGATIVE
MICROORGANISM SPEC CULT: NORMAL
N GONORRHOEA DNA SPEC QL PROBE+SIG AMP: NEGATIVE
SPECIMEN DESCRIPTION: NORMAL
SPECIMEN DESCRIPTION: NORMAL

## 2025-04-21 ENCOUNTER — ANESTHESIA (OUTPATIENT)
Dept: LABOR AND DELIVERY | Age: 40
DRG: 540 | End: 2025-04-21
Payer: COMMERCIAL

## 2025-04-21 ENCOUNTER — ANESTHESIA EVENT (OUTPATIENT)
Dept: LABOR AND DELIVERY | Age: 40
DRG: 540 | End: 2025-04-21
Payer: COMMERCIAL

## 2025-04-21 ENCOUNTER — HOSPITAL ENCOUNTER (INPATIENT)
Age: 40
LOS: 3 days | Discharge: HOME | DRG: 540 | End: 2025-04-24
Attending: OBSTETRICS & GYNECOLOGY | Admitting: OBSTETRICS & GYNECOLOGY
Payer: COMMERCIAL

## 2025-04-21 PROBLEM — O34.211 MATERNAL CARE DUE TO LOW TRANSVERSE UTERINE SCAR FROM PREVIOUS CESAREAN DELIVERY: Status: ACTIVE | Noted: 2025-04-21

## 2025-04-21 PROBLEM — O24.415 GESTATIONAL DIABETES MELLITUS (GDM) CONTROLLED ON ORAL HYPOGLYCEMIC DRUG, ANTEPARTUM: Status: ACTIVE | Noted: 2025-04-21

## 2025-04-21 PROBLEM — Z3A.38 38 WEEKS GESTATION OF PREGNANCY: Status: ACTIVE | Noted: 2025-04-21

## 2025-04-21 PROBLEM — Z34.90 TERM PREGNANCY: Status: ACTIVE | Noted: 2025-04-21

## 2025-04-21 LAB
ABO + RH BLD: NORMAL
ALBUMIN SERPL-MCNC: 3.3 G/DL (ref 3.5–5.2)
ALP SERPL-CCNC: 192 U/L (ref 35–104)
ALT SERPL-CCNC: 10 U/L (ref 0–32)
ANION GAP SERPL CALCULATED.3IONS-SCNC: 13 MMOL/L (ref 7–16)
ARM BAND NUMBER: NORMAL
AST SERPL-CCNC: 17 U/L (ref 0–31)
BILIRUB SERPL-MCNC: 0.7 MG/DL (ref 0–1.2)
BLOOD BANK SAMPLE EXPIRATION: NORMAL
BLOOD GROUP ANTIBODIES SERPL: NEGATIVE
BUN SERPL-MCNC: 8 MG/DL (ref 6–20)
CALCIUM SERPL-MCNC: 9.3 MG/DL (ref 8.6–10.2)
CHLORIDE SERPL-SCNC: 103 MMOL/L (ref 98–107)
CO2 SERPL-SCNC: 19 MMOL/L (ref 22–29)
CREAT SERPL-MCNC: 0.5 MG/DL (ref 0.5–1)
ERYTHROCYTE [DISTWIDTH] IN BLOOD BY AUTOMATED COUNT: 15 % (ref 11.5–15)
GFR, ESTIMATED: >90 ML/MIN/1.73M2
GLUCOSE SERPL-MCNC: 79 MG/DL (ref 74–99)
HCT VFR BLD AUTO: 31.5 % (ref 34–48)
HGB BLD-MCNC: 10 G/DL (ref 11.5–15.5)
MCH RBC QN AUTO: 20.7 PG (ref 26–35)
MCHC RBC AUTO-ENTMCNC: 31.7 G/DL (ref 32–34.5)
MCV RBC AUTO: 65.1 FL (ref 80–99.9)
PLATELET # BLD AUTO: 317 K/UL (ref 130–450)
PMV BLD AUTO: 10.5 FL (ref 7–12)
POTASSIUM SERPL-SCNC: 3.7 MMOL/L (ref 3.5–5)
PROT SERPL-MCNC: 6.4 G/DL (ref 6.4–8.3)
RBC # BLD AUTO: 4.84 M/UL (ref 3.5–5.5)
SODIUM SERPL-SCNC: 135 MMOL/L (ref 132–146)
WBC OTHER # BLD: 7.8 K/UL (ref 4.5–11.5)

## 2025-04-21 PROCEDURE — 86900 BLOOD TYPING SEROLOGIC ABO: CPT

## 2025-04-21 PROCEDURE — 1220000001 HC SEMI PRIVATE L&D R&B

## 2025-04-21 PROCEDURE — 3700000001 HC ADD 15 MINUTES (ANESTHESIA): Performed by: OBSTETRICS & GYNECOLOGY

## 2025-04-21 PROCEDURE — 6360000002 HC RX W HCPCS: Performed by: OBSTETRICS & GYNECOLOGY

## 2025-04-21 PROCEDURE — 6360000002 HC RX W HCPCS: Performed by: NURSE ANESTHETIST, CERTIFIED REGISTERED

## 2025-04-21 PROCEDURE — 3609079900 HC CESAREAN SECTION: Performed by: OBSTETRICS & GYNECOLOGY

## 2025-04-21 PROCEDURE — 86901 BLOOD TYPING SEROLOGIC RH(D): CPT

## 2025-04-21 PROCEDURE — 2709999900 HC NON-CHARGEABLE SUPPLY: Performed by: OBSTETRICS & GYNECOLOGY

## 2025-04-21 PROCEDURE — 2500000003 HC RX 250 WO HCPCS: Performed by: NURSE ANESTHETIST, CERTIFIED REGISTERED

## 2025-04-21 PROCEDURE — 2580000003 HC RX 258: Performed by: NURSE ANESTHETIST, CERTIFIED REGISTERED

## 2025-04-21 PROCEDURE — 7100000000 HC PACU RECOVERY - FIRST 15 MIN: Performed by: OBSTETRICS & GYNECOLOGY

## 2025-04-21 PROCEDURE — 86592 SYPHILIS TEST NON-TREP QUAL: CPT

## 2025-04-21 PROCEDURE — 80307 DRUG TEST PRSMV CHEM ANLYZR: CPT

## 2025-04-21 PROCEDURE — 7100000001 HC PACU RECOVERY - ADDTL 15 MIN: Performed by: OBSTETRICS & GYNECOLOGY

## 2025-04-21 PROCEDURE — 86850 RBC ANTIBODY SCREEN: CPT

## 2025-04-21 PROCEDURE — 2580000003 HC RX 258: Performed by: OBSTETRICS & GYNECOLOGY

## 2025-04-21 PROCEDURE — 80053 COMPREHEN METABOLIC PANEL: CPT

## 2025-04-21 PROCEDURE — 2500000003 HC RX 250 WO HCPCS: Performed by: OBSTETRICS & GYNECOLOGY

## 2025-04-21 PROCEDURE — 3700000000 HC ANESTHESIA ATTENDED CARE: Performed by: OBSTETRICS & GYNECOLOGY

## 2025-04-21 PROCEDURE — 6370000000 HC RX 637 (ALT 250 FOR IP): Performed by: OBSTETRICS & GYNECOLOGY

## 2025-04-21 PROCEDURE — 85027 COMPLETE CBC AUTOMATED: CPT

## 2025-04-21 RX ORDER — SODIUM CHLORIDE 0.9 % (FLUSH) 0.9 %
5-40 SYRINGE (ML) INJECTION EVERY 12 HOURS SCHEDULED
Status: DISCONTINUED | OUTPATIENT
Start: 2025-04-21 | End: 2025-04-24 | Stop reason: HOSPADM

## 2025-04-21 RX ORDER — SODIUM CHLORIDE, SODIUM LACTATE, POTASSIUM CHLORIDE, CALCIUM CHLORIDE 600; 310; 30; 20 MG/100ML; MG/100ML; MG/100ML; MG/100ML
INJECTION, SOLUTION INTRAVENOUS
Status: DISCONTINUED | OUTPATIENT
Start: 2025-04-21 | End: 2025-04-21 | Stop reason: SDUPTHER

## 2025-04-21 RX ORDER — SODIUM CHLORIDE, SODIUM LACTATE, POTASSIUM CHLORIDE, CALCIUM CHLORIDE 600; 310; 30; 20 MG/100ML; MG/100ML; MG/100ML; MG/100ML
INJECTION, SOLUTION INTRAVENOUS CONTINUOUS
Status: DISCONTINUED | OUTPATIENT
Start: 2025-04-21 | End: 2025-04-24 | Stop reason: HOSPADM

## 2025-04-21 RX ORDER — ONDANSETRON 2 MG/ML
4 INJECTION INTRAMUSCULAR; INTRAVENOUS EVERY 6 HOURS PRN
Status: DISCONTINUED | OUTPATIENT
Start: 2025-04-21 | End: 2025-04-21

## 2025-04-21 RX ORDER — MISOPROSTOL 200 UG/1
800 TABLET ORAL PRN
Status: DISCONTINUED | OUTPATIENT
Start: 2025-04-21 | End: 2025-04-24 | Stop reason: HOSPADM

## 2025-04-21 RX ORDER — ONDANSETRON 4 MG/1
4 TABLET, ORALLY DISINTEGRATING ORAL EVERY 8 HOURS PRN
Status: DISCONTINUED | OUTPATIENT
Start: 2025-04-21 | End: 2025-04-24 | Stop reason: HOSPADM

## 2025-04-21 RX ORDER — MODIFIED LANOLIN
OINTMENT (GRAM) TOPICAL
Status: DISCONTINUED | OUTPATIENT
Start: 2025-04-21 | End: 2025-04-24 | Stop reason: HOSPADM

## 2025-04-21 RX ORDER — SODIUM CHLORIDE 0.9 % (FLUSH) 0.9 %
10 SYRINGE (ML) INJECTION EVERY 12 HOURS SCHEDULED
Status: DISCONTINUED | OUTPATIENT
Start: 2025-04-21 | End: 2025-04-21

## 2025-04-21 RX ORDER — ONDANSETRON 2 MG/ML
INJECTION INTRAMUSCULAR; INTRAVENOUS
Status: DISCONTINUED | OUTPATIENT
Start: 2025-04-21 | End: 2025-04-21 | Stop reason: SDUPTHER

## 2025-04-21 RX ORDER — SODIUM CHLORIDE 9 MG/ML
INJECTION, SOLUTION INTRAVENOUS PRN
Status: DISCONTINUED | OUTPATIENT
Start: 2025-04-21 | End: 2025-04-24 | Stop reason: HOSPADM

## 2025-04-21 RX ORDER — CITRIC ACID/SODIUM CITRATE 334-500MG
30 SOLUTION, ORAL ORAL ONCE
Status: COMPLETED | OUTPATIENT
Start: 2025-04-21 | End: 2025-04-21

## 2025-04-21 RX ORDER — OXYCODONE HYDROCHLORIDE 5 MG/1
5 TABLET ORAL EVERY 4 HOURS PRN
Status: DISCONTINUED | OUTPATIENT
Start: 2025-04-21 | End: 2025-04-24 | Stop reason: HOSPADM

## 2025-04-21 RX ORDER — DIPHENHYDRAMINE HYDROCHLORIDE 50 MG/ML
INJECTION, SOLUTION INTRAMUSCULAR; INTRAVENOUS
Status: DISCONTINUED | OUTPATIENT
Start: 2025-04-21 | End: 2025-04-21 | Stop reason: SDUPTHER

## 2025-04-21 RX ORDER — IBUPROFEN 800 MG/1
800 TABLET, FILM COATED ORAL EVERY 8 HOURS
Status: DISCONTINUED | OUTPATIENT
Start: 2025-04-22 | End: 2025-04-24 | Stop reason: HOSPADM

## 2025-04-21 RX ORDER — SODIUM CHLORIDE 0.9 % (FLUSH) 0.9 %
5-40 SYRINGE (ML) INJECTION PRN
Status: DISCONTINUED | OUTPATIENT
Start: 2025-04-21 | End: 2025-04-24 | Stop reason: HOSPADM

## 2025-04-21 RX ORDER — ENOXAPARIN SODIUM 100 MG/ML
40 INJECTION SUBCUTANEOUS DAILY
Status: DISCONTINUED | OUTPATIENT
Start: 2025-04-22 | End: 2025-04-24 | Stop reason: HOSPADM

## 2025-04-21 RX ORDER — KETOROLAC TROMETHAMINE 30 MG/ML
30 INJECTION, SOLUTION INTRAMUSCULAR; INTRAVENOUS EVERY 6 HOURS
Status: DISPENSED | OUTPATIENT
Start: 2025-04-21 | End: 2025-04-22

## 2025-04-21 RX ORDER — ACETAMINOPHEN 500 MG
1000 TABLET ORAL EVERY 8 HOURS PRN
Status: DISCONTINUED | OUTPATIENT
Start: 2025-04-21 | End: 2025-04-24 | Stop reason: HOSPADM

## 2025-04-21 RX ORDER — DEXAMETHASONE SODIUM PHOSPHATE 10 MG/ML
INJECTION, SOLUTION INTRAMUSCULAR; INTRAVENOUS
Status: DISCONTINUED | OUTPATIENT
Start: 2025-04-21 | End: 2025-04-21 | Stop reason: SDUPTHER

## 2025-04-21 RX ORDER — OXYCODONE HYDROCHLORIDE 5 MG/1
10 TABLET ORAL EVERY 4 HOURS PRN
Status: DISCONTINUED | OUTPATIENT
Start: 2025-04-21 | End: 2025-04-24 | Stop reason: HOSPADM

## 2025-04-21 RX ORDER — ACETAMINOPHEN 325 MG/1
975 TABLET ORAL ONCE
Status: COMPLETED | OUTPATIENT
Start: 2025-04-21 | End: 2025-04-21

## 2025-04-21 RX ORDER — EPHEDRINE SULFATE 50 MG/ML
INJECTION INTRAVENOUS
Status: DISCONTINUED | OUTPATIENT
Start: 2025-04-21 | End: 2025-04-21 | Stop reason: SDUPTHER

## 2025-04-21 RX ORDER — SODIUM CHLORIDE 9 MG/ML
INJECTION, SOLUTION INTRAVENOUS PRN
Status: DISCONTINUED | OUTPATIENT
Start: 2025-04-21 | End: 2025-04-21

## 2025-04-21 RX ORDER — SODIUM CHLORIDE 0.9 % (FLUSH) 0.9 %
10 SYRINGE (ML) INJECTION PRN
Status: DISCONTINUED | OUTPATIENT
Start: 2025-04-21 | End: 2025-04-21

## 2025-04-21 RX ORDER — PHENYLEPHRINE HYDROCHLORIDE 10 MG/ML
INJECTION INTRAVENOUS
Status: DISCONTINUED | OUTPATIENT
Start: 2025-04-21 | End: 2025-04-21 | Stop reason: SDUPTHER

## 2025-04-21 RX ORDER — DOCUSATE SODIUM 100 MG/1
100 CAPSULE, LIQUID FILLED ORAL 2 TIMES DAILY
Status: DISCONTINUED | OUTPATIENT
Start: 2025-04-21 | End: 2025-04-24 | Stop reason: HOSPADM

## 2025-04-21 RX ORDER — FERROUS SULFATE 325(65) MG
325 TABLET ORAL 2 TIMES DAILY WITH MEALS
Status: DISCONTINUED | OUTPATIENT
Start: 2025-04-22 | End: 2025-04-24 | Stop reason: HOSPADM

## 2025-04-21 RX ORDER — SODIUM CHLORIDE, SODIUM LACTATE, POTASSIUM CHLORIDE, CALCIUM CHLORIDE 600; 310; 30; 20 MG/100ML; MG/100ML; MG/100ML; MG/100ML
INJECTION, SOLUTION INTRAVENOUS CONTINUOUS
Status: DISCONTINUED | OUTPATIENT
Start: 2025-04-21 | End: 2025-04-21

## 2025-04-21 RX ORDER — MORPHINE SULFATE 0.5 MG/ML
INJECTION, SOLUTION EPIDURAL; INTRATHECAL; INTRAVENOUS
Status: DISCONTINUED | OUTPATIENT
Start: 2025-04-21 | End: 2025-04-21 | Stop reason: SDUPTHER

## 2025-04-21 RX ORDER — ONDANSETRON 2 MG/ML
4 INJECTION INTRAMUSCULAR; INTRAVENOUS EVERY 6 HOURS PRN
Status: DISCONTINUED | OUTPATIENT
Start: 2025-04-21 | End: 2025-04-24 | Stop reason: HOSPADM

## 2025-04-21 RX ORDER — METHYLERGONOVINE MALEATE 0.2 MG/ML
200 INJECTION INTRAVENOUS PRN
Status: DISCONTINUED | OUTPATIENT
Start: 2025-04-21 | End: 2025-04-24 | Stop reason: HOSPADM

## 2025-04-21 RX ORDER — SODIUM CHLORIDE, SODIUM LACTATE, POTASSIUM CHLORIDE, AND CALCIUM CHLORIDE .6; .31; .03; .02 G/100ML; G/100ML; G/100ML; G/100ML
1000 INJECTION, SOLUTION INTRAVENOUS ONCE
Status: COMPLETED | OUTPATIENT
Start: 2025-04-21 | End: 2025-04-21

## 2025-04-21 RX ADMIN — OXYCODONE 10 MG: 5 TABLET ORAL at 21:55

## 2025-04-21 RX ADMIN — ONDANSETRON 4 MG: 2 INJECTION, SOLUTION INTRAMUSCULAR; INTRAVENOUS at 16:28

## 2025-04-21 RX ADMIN — SODIUM CHLORIDE, SODIUM LACTATE, POTASSIUM CHLORIDE, AND CALCIUM CHLORIDE: .6; .31; .03; .02 INJECTION, SOLUTION INTRAVENOUS at 15:27

## 2025-04-21 RX ADMIN — PHENYLEPHRINE HYDROCHLORIDE 100 MCG: 10 INJECTION INTRAVENOUS at 16:36

## 2025-04-21 RX ADMIN — PHENYLEPHRINE HYDROCHLORIDE 100 MCG: 10 INJECTION INTRAVENOUS at 16:47

## 2025-04-21 RX ADMIN — EPHEDRINE SULFATE 50 MG: 50 INJECTION INTRAVENOUS at 16:49

## 2025-04-21 RX ADMIN — PHENYLEPHRINE HYDROCHLORIDE 100 MCG: 10 INJECTION INTRAVENOUS at 16:13

## 2025-04-21 RX ADMIN — DIPHENHYDRAMINE HYDROCHLORIDE 50 MG: 50 INJECTION INTRAMUSCULAR; INTRAVENOUS at 16:28

## 2025-04-21 RX ADMIN — PHENYLEPHRINE HYDROCHLORIDE 100 MCG: 10 INJECTION INTRAVENOUS at 16:29

## 2025-04-21 RX ADMIN — ACETAMINOPHEN 1000 MG: 500 TABLET ORAL at 23:41

## 2025-04-21 RX ADMIN — DEXAMETHASONE SODIUM PHOSPHATE 10 MG: 10 INJECTION, SOLUTION INTRAMUSCULAR; INTRAVENOUS at 16:28

## 2025-04-21 RX ADMIN — SODIUM CHLORIDE, POTASSIUM CHLORIDE, SODIUM LACTATE AND CALCIUM CHLORIDE: 600; 310; 30; 20 INJECTION, SOLUTION INTRAVENOUS at 15:57

## 2025-04-21 RX ADMIN — FAMOTIDINE 20 MG: 10 INJECTION, SOLUTION INTRAVENOUS at 15:33

## 2025-04-21 RX ADMIN — MORPHINE SULFATE 0.15 MG: 0.5 INJECTION, SOLUTION EPIDURAL; INTRATHECAL; INTRAVENOUS at 16:09

## 2025-04-21 RX ADMIN — Medication 150 ML: at 16:27

## 2025-04-21 RX ADMIN — SODIUM CHLORIDE, SODIUM LACTATE, POTASSIUM CHLORIDE, AND CALCIUM CHLORIDE: .6; .31; .03; .02 INJECTION, SOLUTION INTRAVENOUS at 17:25

## 2025-04-21 RX ADMIN — Medication 87.3 MILLI-UNITS/MIN: at 17:10

## 2025-04-21 RX ADMIN — CEFOXITIN SODIUM 2000 MG: 2 POWDER, FOR SOLUTION INTRAVENOUS at 15:54

## 2025-04-21 RX ADMIN — PHENYLEPHRINE HYDROCHLORIDE 100 MCG: 10 INJECTION INTRAVENOUS at 16:24

## 2025-04-21 RX ADMIN — KETOROLAC TROMETHAMINE 30 MG: 30 INJECTION, SOLUTION INTRAMUSCULAR; INTRAVENOUS at 19:29

## 2025-04-21 RX ADMIN — PHENYLEPHRINE HYDROCHLORIDE 100 MCG: 10 INJECTION INTRAVENOUS at 16:18

## 2025-04-21 RX ADMIN — ACETAMINOPHEN 975 MG: 325 TABLET ORAL at 15:30

## 2025-04-21 RX ADMIN — MORPHINE SULFATE 4.85 MG: 0.5 INJECTION, SOLUTION EPIDURAL; INTRATHECAL; INTRAVENOUS at 16:29

## 2025-04-21 RX ADMIN — SODIUM CITRATE AND CITRIC ACID MONOHYDRATE 30 ML: 500; 334 SOLUTION ORAL at 15:29

## 2025-04-21 RX ADMIN — SODIUM CHLORIDE, SODIUM LACTATE, POTASSIUM CHLORIDE, AND CALCIUM CHLORIDE 1000 ML: .6; .31; .03; .02 INJECTION, SOLUTION INTRAVENOUS at 14:30

## 2025-04-21 ASSESSMENT — PAIN SCALES - GENERAL
PAINLEVEL_OUTOF10: 7
PAINLEVEL_OUTOF10: 0
PAINLEVEL_OUTOF10: 8

## 2025-04-21 ASSESSMENT — PAIN DESCRIPTION - LOCATION
LOCATION: ABDOMEN;INCISION
LOCATION: ABDOMEN;INCISION
LOCATION: ABDOMEN

## 2025-04-21 ASSESSMENT — PAIN - FUNCTIONAL ASSESSMENT
PAIN_FUNCTIONAL_ASSESSMENT: ACTIVITIES ARE NOT PREVENTED

## 2025-04-21 ASSESSMENT — PAIN DESCRIPTION - DESCRIPTORS
DESCRIPTORS: SORE
DESCRIPTORS: SORE;DISCOMFORT
DESCRIPTORS: BURNING;DISCOMFORT

## 2025-04-21 ASSESSMENT — PAIN DESCRIPTION - ORIENTATION: ORIENTATION: LOWER;MID

## 2025-04-21 NOTE — ANESTHESIA PRE PROCEDURE
02:30 PM    GLUCOSE 79 04/21/2025 02:30 PM    CALCIUM 9.3 04/21/2025 02:30 PM    BILITOT 0.7 04/21/2025 02:30 PM    ALKPHOS 192 04/21/2025 02:30 PM    AST 17 04/21/2025 02:30 PM    ALT 10 04/21/2025 02:30 PM       POC Tests: No results for input(s): \"POCGLU\", \"POCNA\", \"POCK\", \"POCCL\", \"POCBUN\", \"POCHEMO\", \"POCHCT\" in the last 72 hours.    Coags: No results found for: \"PROTIME\", \"INR\", \"APTT\"    HCG (If Applicable): No results found for: \"PREGTESTUR\", \"PREGSERUM\", \"HCG\", \"HCGQUANT\"     ABGs: No results found for: \"PHART\", \"PO2ART\", \"HAY3KXN\", \"JVP7HCR\", \"BEART\", \"O2JTHPWU\"     Type & Screen (If Applicable):  Lab Results   Component Value Date    ABORH O POSITIVE 10/18/2023    LABANTI NEGATIVE 10/18/2023       Drug/Infectious Status (If Applicable):  No results found for: \"HIV\", \"HEPCAB\"    COVID-19 Screening (If Applicable):   Lab Results   Component Value Date/Time    COVID19 Not Detected 07/12/2022 09:15 PM           Anesthesia Evaluation    Airway: Mallampati: II          Dental: normal exam         Pulmonary:Negative Pulmonary ROS breath sounds clear to auscultation                             Cardiovascular:    (+) hypertension:        Rhythm: regular  Rate: normal                    Neuro/Psych:   (+) psychiatric history:            GI/Hepatic/Renal:   (+) GERD:, morbid obesity          Endo/Other:                     Abdominal:   (+) obese          Vascular: negative vascular ROS.         Other Findings:             Anesthesia Plan      spinal     ASA 2             Anesthetic plan and risks discussed with patient.      Plan discussed with CRNA.    Attending anesthesiologist reviewed and agrees with Preprocedure content                MACHO Sibley - MACO   4/21/2025

## 2025-04-21 NOTE — PROGRESS NOTES
38w5d  Patient arrived to unit for scheduled repeat  section.  Patient placed in room 213 and explained urine collection and pre-op skin wipes.

## 2025-04-21 NOTE — PROGRESS NOTES
Assuming care of patient at this time. RN to bedside, patient resting comfortably with family supportive at bedside. IV toradol given per orders for pain. Patient denies any other needs at this time. Vazquez catheter is draining appropriately. Fundus is firm with minimal bleeding. Vitals are WNL. Plan of care for the night discussed and she verbalizes agreement. Call light in reach.

## 2025-04-21 NOTE — ANESTHESIA PROCEDURE NOTES
Spinal Block    Patient location during procedure: OB  End time: 4/21/2025 4:09 PM  Reason for block: primary anesthetic and at surgeon's request  Staffing  Performed: resident/CRNA   Anesthesiologist: Doroteo Blair DO  Resident/CRNA: Archie Jernigan APRN - CRNA  Performed by: Archie Jernigan APRN - CRNA  Authorized by: Doroteo Blair DO    Spinal Block  Patient position: sitting  Prep: Betadine and site prepped and draped  Patient monitoring: cardiac monitor, continuous pulse ox, continuous capnometry, frequent blood pressure checks and oxygen  Approach: midline  Location: L3/L4  Guidance: paresthesia technique  Provider prep: mask and sterile gloves  Local infiltration: lidocaine  Needle  Needle type: Pencan   Needle gauge: 24 G  Needle length: 4 in  Assessment  Sensory level: T6  Swirl obtained: Yes  CSF: clear  Attempts: 1  Hemodynamics: stable  Preanesthetic Checklist  Completed: patient identified, IV checked, site marked, risks and benefits discussed, surgical/procedural consents, equipment checked, pre-op evaluation, timeout performed, anesthesia consent given, oxygen available, monitors applied/VS acknowledged, fire risk safety assessment completed and verbalized and blood product R/B/A discussed and consented

## 2025-04-21 NOTE — OP NOTE
hemostatic.The Davy retractor was removed. Correct needle, sponge and instrument count was reported and the abdomen was then closed in layers using #1 Stratafix for the fascia and the subcuticular stapler for the skin. Steristrips were applied followed by a sterile dressing and the patient was then transferred to the recovery room in good stable condition.    Dayanara Mclean MD

## 2025-04-21 NOTE — H&P
Subjective:      Tameka Argueta is an 39 y.o. female at 38 and 5/7 weeks gestation presenting with   Chief Complaint   Patient presents with    Scheduled    History of 1 previous   History of gestational diabetes, noncompliant with her NSTs..She is also complaining of contractions every a few minutes lasting few seconds. Other associated symptoms include low back pain. Fetal Movement: normal.  Patient's medications, allergies, past medical, surgical, social and family histories were reviewed and updated as appropriate.  Social History:  TOBACCO:   reports that she has quit smoking. Her smoking use included cigarettes. She has a 5 pack-year smoking history. She has never used smokeless tobacco.  ETOH:   reports that she does not currently use alcohol.  DRUGS:   reports that she does not currently use drugs after having used the following drugs: Cocaine and Marijuana (Weed).    Past Medical History:  Past Medical History:   Diagnosis Date    Abnormal Pap smear of cervix     Anxious depression     GERD (gastroesophageal reflux disease)     Hypertension          Past Surgical History:      Procedure Laterality Date    ANKLE FRACTURE SURGERY Right 2022    RIGHT ANKLE OPEN REDUCTION INTERNAL FIXATION performed by Harvinder Bang MD at [facility] OR     SECTION      FRACTURE SURGERY      HERNIA REPAIR      LEEP N/A 10/18/2023    LEEP performed by Yoshi Fang MD at [facility] OR    OVARIAN CYST REMOVAL         Family History:       Problem Relation Age of Onset    Substance Abuse Mother     Substance Abuse Father     Substance Abuse Sister        meds:  Current Facility-Administered Medications:     lactated ringers infusion, , IntraVENous, Continuous, Dayanara Mclean MD, Last Rate: 125 mL/hr at 25 1527, New Bag at 25 1527    sodium chloride flush 0.9 % injection 10 mL, 10 mL, IntraVENous, 2 times per day, Dayanara Mclean MD    sodium chloride flush 0.9 % injection 10 mL, 10 mL,

## 2025-04-22 LAB
AMPHET UR QL SCN: NEGATIVE
BARBITURATES UR QL SCN: NEGATIVE
BENZODIAZ UR QL: NEGATIVE
BUPRENORPHINE UR QL: NEGATIVE
CANNABINOIDS UR QL SCN: NEGATIVE
COCAINE UR QL SCN: NEGATIVE
ERYTHROCYTE [DISTWIDTH] IN BLOOD BY AUTOMATED COUNT: 14.9 % (ref 11.5–15)
FENTANYL UR QL: NEGATIVE
HCT VFR BLD AUTO: 27.5 % (ref 34–48)
HGB BLD-MCNC: 8.7 G/DL (ref 11.5–15.5)
MCH RBC QN AUTO: 20.8 PG (ref 26–35)
MCHC RBC AUTO-ENTMCNC: 31.6 G/DL (ref 32–34.5)
MCV RBC AUTO: 65.6 FL (ref 80–99.9)
METHADONE UR QL: NEGATIVE
OPIATES UR QL SCN: NEGATIVE
OXYCODONE UR QL SCN: NEGATIVE
PCP UR QL SCN: NEGATIVE
PLATELET # BLD AUTO: 283 K/UL (ref 130–450)
PMV BLD AUTO: 9.9 FL (ref 7–12)
RBC # BLD AUTO: 4.19 M/UL (ref 3.5–5.5)
RPR SER QL: NONREACTIVE
TEST INFORMATION: NORMAL
WBC OTHER # BLD: 12.2 K/UL (ref 4.5–11.5)

## 2025-04-22 PROCEDURE — 6360000002 HC RX W HCPCS

## 2025-04-22 PROCEDURE — 85027 COMPLETE CBC AUTOMATED: CPT

## 2025-04-22 PROCEDURE — 1220000001 HC SEMI PRIVATE L&D R&B

## 2025-04-22 PROCEDURE — 6360000002 HC RX W HCPCS: Performed by: OBSTETRICS & GYNECOLOGY

## 2025-04-22 PROCEDURE — 2500000003 HC RX 250 WO HCPCS: Performed by: OBSTETRICS & GYNECOLOGY

## 2025-04-22 PROCEDURE — 6370000000 HC RX 637 (ALT 250 FOR IP): Performed by: OBSTETRICS & GYNECOLOGY

## 2025-04-22 RX ORDER — DIPHENHYDRAMINE HYDROCHLORIDE 50 MG/ML
25 INJECTION, SOLUTION INTRAMUSCULAR; INTRAVENOUS EVERY 6 HOURS PRN
Status: DISCONTINUED | OUTPATIENT
Start: 2025-04-22 | End: 2025-04-24 | Stop reason: HOSPADM

## 2025-04-22 RX ORDER — DIPHENHYDRAMINE HYDROCHLORIDE 50 MG/ML
INJECTION, SOLUTION INTRAMUSCULAR; INTRAVENOUS
Status: COMPLETED
Start: 2025-04-22 | End: 2025-04-22

## 2025-04-22 RX ADMIN — SODIUM CHLORIDE, PRESERVATIVE FREE 10 ML: 5 INJECTION INTRAVENOUS at 14:35

## 2025-04-22 RX ADMIN — FERROUS SULFATE TAB 325 MG (65 MG ELEMENTAL FE) 325 MG: 325 (65 FE) TAB at 17:27

## 2025-04-22 RX ADMIN — ACETAMINOPHEN 1000 MG: 500 TABLET ORAL at 09:41

## 2025-04-22 RX ADMIN — KETOROLAC TROMETHAMINE 30 MG: 30 INJECTION, SOLUTION INTRAMUSCULAR; INTRAVENOUS at 14:34

## 2025-04-22 RX ADMIN — OXYCODONE 10 MG: 5 TABLET ORAL at 21:51

## 2025-04-22 RX ADMIN — DIPHENHYDRAMINE HYDROCHLORIDE 25 MG: 50 INJECTION INTRAMUSCULAR; INTRAVENOUS at 03:07

## 2025-04-22 RX ADMIN — KETOROLAC TROMETHAMINE 30 MG: 30 INJECTION, SOLUTION INTRAMUSCULAR; INTRAVENOUS at 03:07

## 2025-04-22 RX ADMIN — OXYCODONE 10 MG: 5 TABLET ORAL at 17:30

## 2025-04-22 RX ADMIN — DOCUSATE SODIUM 100 MG: 100 CAPSULE, LIQUID FILLED ORAL at 09:42

## 2025-04-22 RX ADMIN — IBUPROFEN 800 MG: 800 TABLET, FILM COATED ORAL at 23:43

## 2025-04-22 RX ADMIN — ENOXAPARIN SODIUM 40 MG: 100 INJECTION SUBCUTANEOUS at 09:43

## 2025-04-22 RX ADMIN — FERROUS SULFATE TAB 325 MG (65 MG ELEMENTAL FE) 325 MG: 325 (65 FE) TAB at 09:42

## 2025-04-22 RX ADMIN — DIPHENHYDRAMINE HYDROCHLORIDE 25 MG: 50 INJECTION, SOLUTION INTRAMUSCULAR; INTRAVENOUS at 03:07

## 2025-04-22 RX ADMIN — SODIUM CHLORIDE, PRESERVATIVE FREE 5 ML: 5 INJECTION INTRAVENOUS at 09:42

## 2025-04-22 RX ADMIN — ACETAMINOPHEN 1000 MG: 500 TABLET ORAL at 21:51

## 2025-04-22 RX ADMIN — DOCUSATE SODIUM 100 MG: 100 CAPSULE, LIQUID FILLED ORAL at 21:51

## 2025-04-22 ASSESSMENT — PAIN DESCRIPTION - LOCATION
LOCATION: ABDOMEN
LOCATION: INCISION
LOCATION: ABDOMEN

## 2025-04-22 ASSESSMENT — PAIN SCALES - GENERAL
PAINLEVEL_OUTOF10: 6
PAINLEVEL_OUTOF10: 8
PAINLEVEL_OUTOF10: 6
PAINLEVEL_OUTOF10: 4
PAINLEVEL_OUTOF10: 9
PAINLEVEL_OUTOF10: 4
PAINLEVEL_OUTOF10: 5

## 2025-04-22 ASSESSMENT — PAIN DESCRIPTION - DESCRIPTORS
DESCRIPTORS: BURNING;DISCOMFORT
DESCRIPTORS: ACHING;SORE;BURNING
DESCRIPTORS: SORE
DESCRIPTORS: SORE

## 2025-04-22 ASSESSMENT — PAIN - FUNCTIONAL ASSESSMENT
PAIN_FUNCTIONAL_ASSESSMENT: ACTIVITIES ARE NOT PREVENTED

## 2025-04-22 ASSESSMENT — PAIN DESCRIPTION - ORIENTATION: ORIENTATION: LOWER;MID

## 2025-04-22 NOTE — ANESTHESIA POSTPROCEDURE EVALUATION
Department of Anesthesiology  Postprocedure Note    Patient: Tameka Argueta  MRN: [id number]  YOB: 1985  Date of evaluation: 2025    Procedure Summary       Date: 25 Room / Location: [facility] L&D OR [address] [place] [address]    Anesthesia Start: 1557 Anesthesia Stop: 1700    Procedure: REPEAT  SECTION (Abdomen) Diagnosis:       Status post repeat low transverse  section      (Status post repeat low transverse  section [Z98.891])    Surgeons: Dayanara Mclean MD Responsible Provider: Doroteo Blair DO    Anesthesia Type: spinal ASA Status: 2            Anesthesia Type: No value filed.    Bar Phase I: Bar Score: 9    Bar Phase II: Bar Score: 10    Anesthesia Post Evaluation    No notable events documented.

## 2025-04-22 NOTE — PLAN OF CARE
Problem: Postpartum  Goal: Experiences normal postpartum course  Description:  Postpartum OB-Pregnancy care plan goal which identifies if the mother is experiencing a normal postpartum course  Outcome: Progressing     Problem: Postpartum  Goal: Appropriate maternal -  bonding  Description:  Postpartum OB-Pregnancy care plan goal which identifies if the mother and  are bonding appropriately  Outcome: Progressing     Problem: Pain  Goal: Verbalizes/displays adequate comfort level or baseline comfort level  Outcome: Progressing

## 2025-04-22 NOTE — PROGRESS NOTES
Subjective:     Postpartum Day 1:  Delivery    The patient feels well. The patient denies emotional concerns. Pain is well controlled with current medications. The baby is well. Urinary output is adequate. The patient is ambulating well. The patient is tolerating a normal diet. Flatus has been passed.    Objective:       Vitals:    25 0720   BP: 105/74   Pulse: 67   Resp: 16   Temp: 97.9 °F (36.6 °C)   SpO2: 96%         General:    alert, appears stated age, and cooperative   Bowel Sounds:  distant   Lochia:  appropriate   Uterine Fundus:   firm   Incision:  healing well, no significant drainage, no dehiscence, no significant erythema   DVT Evaluation:  No evidence of DVT seen on physical exam.     CBC   Lab Results   Component Value Date    WBC 12.2 (H) 2025    HGB 8.7 (L) 2025    HCT 27.5 (L) 2025    MCV 65.6 (L) 2025     2025        Assessment:     Status post  section. Doing well postoperatively.     Plan:     Continue current care.

## 2025-04-23 PROCEDURE — 6370000000 HC RX 637 (ALT 250 FOR IP): Performed by: OBSTETRICS & GYNECOLOGY

## 2025-04-23 PROCEDURE — 1220000001 HC SEMI PRIVATE L&D R&B

## 2025-04-23 PROCEDURE — 6360000002 HC RX W HCPCS: Performed by: OBSTETRICS & GYNECOLOGY

## 2025-04-23 RX ORDER — OXYCODONE HYDROCHLORIDE 5 MG/1
5 TABLET ORAL EVERY 6 HOURS PRN
Qty: 12 TABLET | Refills: 0 | Status: SHIPPED | OUTPATIENT
Start: 2025-04-23 | End: 2025-04-26

## 2025-04-23 RX ORDER — IBUPROFEN 800 MG/1
800 TABLET, FILM COATED ORAL EVERY 8 HOURS PRN
Qty: 120 TABLET | Refills: 0 | Status: SHIPPED | OUTPATIENT
Start: 2025-04-23

## 2025-04-23 RX ADMIN — FERROUS SULFATE TAB 325 MG (65 MG ELEMENTAL FE) 325 MG: 325 (65 FE) TAB at 18:09

## 2025-04-23 RX ADMIN — OXYCODONE 10 MG: 5 TABLET ORAL at 05:49

## 2025-04-23 RX ADMIN — IBUPROFEN 800 MG: 800 TABLET, FILM COATED ORAL at 18:09

## 2025-04-23 RX ADMIN — OXYCODONE 10 MG: 5 TABLET ORAL at 15:15

## 2025-04-23 RX ADMIN — OXYCODONE 10 MG: 5 TABLET ORAL at 19:53

## 2025-04-23 RX ADMIN — DOCUSATE SODIUM 100 MG: 100 CAPSULE, LIQUID FILLED ORAL at 08:29

## 2025-04-23 RX ADMIN — FERROUS SULFATE TAB 325 MG (65 MG ELEMENTAL FE) 325 MG: 325 (65 FE) TAB at 08:28

## 2025-04-23 RX ADMIN — ENOXAPARIN SODIUM 40 MG: 100 INJECTION SUBCUTANEOUS at 08:29

## 2025-04-23 RX ADMIN — ACETAMINOPHEN 1000 MG: 500 TABLET ORAL at 15:14

## 2025-04-23 RX ADMIN — IBUPROFEN 800 MG: 800 TABLET, FILM COATED ORAL at 08:28

## 2025-04-23 RX ADMIN — DOCUSATE SODIUM 100 MG: 100 CAPSULE, LIQUID FILLED ORAL at 21:11

## 2025-04-23 RX ADMIN — ACETAMINOPHEN 1000 MG: 500 TABLET ORAL at 05:50

## 2025-04-23 ASSESSMENT — PAIN SCALES - GENERAL
PAINLEVEL_OUTOF10: 8
PAINLEVEL_OUTOF10: 6
PAINLEVEL_OUTOF10: 5
PAINLEVEL_OUTOF10: 7
PAINLEVEL_OUTOF10: 8

## 2025-04-23 ASSESSMENT — PAIN DESCRIPTION - LOCATION
LOCATION: ABDOMEN
LOCATION: INCISION
LOCATION: ABDOMEN
LOCATION: ABDOMEN

## 2025-04-23 ASSESSMENT — PAIN DESCRIPTION - ORIENTATION: ORIENTATION: LOWER;MID

## 2025-04-23 ASSESSMENT — PAIN DESCRIPTION - DESCRIPTORS
DESCRIPTORS: CRAMPING;DISCOMFORT
DESCRIPTORS: CRAMPING
DESCRIPTORS: BURNING;DISCOMFORT

## 2025-04-23 NOTE — PROGRESS NOTES
[employer] [place] Hearing screening results were discussed with parent. Questions answered. Brochure given to parent. Advised to monitor developmental milestones and contact physician for any concerns.   Mavis Wyman, AuD

## 2025-04-23 NOTE — DISCHARGE SUMMARY
Obstetrical Discharge Form         Patients Name  Tameka Argueta    Gestational Age:  38w5d    Antepartum complications: Previous , gestational diabetes, advanced maternal age    Date of Delivery:   2025      4:27 PM     Type of Delivery:   , Low Transverse [251]  Rupture Date/time:    No data found      No data found     Presentation:    Vertex [1]  Position:                      Anesthesia:    Spinal [252]    Feeding method:         Delivered By:   TRESSA-AAL, AMINE R    Baby:       Information for the patient's :  Parul Argueta [[id number]]        Intrapartum complications: None  Postpartum complications: none  Discharge Date:   2025  Discharge Condition: Stable  Disposition:   Home    Plan:   Follow up    in 2 weeks

## 2025-04-23 NOTE — PROGRESS NOTES
Subjective:     Postpartum Day 2:  Delivery    The patient feels well. The patient denies emotional concerns. Pain is well controlled with current medications. The baby is well. Urinary output is adequate. The patient is ambulating well. The patient is tolerating a normal diet. Flatus has been passed.    Objective:       Vitals:    25 1510   BP: 119/78   Pulse: 75   Resp: 16   Temp: 99.1 °F (37.3 °C)   SpO2: 97%         General:    alert, appears stated age, and cooperative   Bowel Sounds:  active   Lochia:  appropriate   Uterine Fundus:   firm   Incision:  healing well, no significant drainage, no dehiscence, no significant erythema   DVT Evaluation:  No evidence of DVT seen on physical exam.     CBC   Lab Results   Component Value Date    WBC 12.2 (H) 2025    HGB 8.7 (L) 2025    HCT 27.5 (L) 2025    MCV 65.6 (L) 2025     2025        Assessment:     Status post  section. Doing well postoperatively.     Plan:     Continue current care.

## 2025-04-24 VITALS
TEMPERATURE: 97.7 F | SYSTOLIC BLOOD PRESSURE: 131 MMHG | HEART RATE: 81 BPM | RESPIRATION RATE: 18 BRPM | DIASTOLIC BLOOD PRESSURE: 82 MMHG | OXYGEN SATURATION: 99 %

## 2025-04-24 PROCEDURE — 6370000000 HC RX 637 (ALT 250 FOR IP): Performed by: OBSTETRICS & GYNECOLOGY

## 2025-04-24 PROCEDURE — 6360000002 HC RX W HCPCS: Performed by: OBSTETRICS & GYNECOLOGY

## 2025-04-24 RX ADMIN — ACETAMINOPHEN 1000 MG: 500 TABLET ORAL at 04:36

## 2025-04-24 RX ADMIN — BENZOCAINE AND MENTHOL 1 LOZENGE: 15; 3.6 LOZENGE ORAL at 06:34

## 2025-04-24 RX ADMIN — OXYCODONE 10 MG: 5 TABLET ORAL at 04:36

## 2025-04-24 RX ADMIN — FERROUS SULFATE TAB 325 MG (65 MG ELEMENTAL FE) 325 MG: 325 (65 FE) TAB at 08:36

## 2025-04-24 RX ADMIN — ENOXAPARIN SODIUM 40 MG: 100 INJECTION SUBCUTANEOUS at 08:35

## 2025-04-24 RX ADMIN — DOCUSATE SODIUM 100 MG: 100 CAPSULE, LIQUID FILLED ORAL at 08:35

## 2025-04-24 ASSESSMENT — PAIN DESCRIPTION - LOCATION
LOCATION: ABDOMEN
LOCATION: ABDOMEN
LOCATION: THROAT

## 2025-04-24 ASSESSMENT — PAIN SCALES - GENERAL
PAINLEVEL_OUTOF10: 10
PAINLEVEL_OUTOF10: 4

## 2025-04-24 ASSESSMENT — PAIN - FUNCTIONAL ASSESSMENT: PAIN_FUNCTIONAL_ASSESSMENT: ACTIVITIES ARE NOT PREVENTED

## 2025-04-24 ASSESSMENT — PAIN DESCRIPTION - DESCRIPTORS
DESCRIPTORS: CRAMPING;DISCOMFORT;DULL;ACHING
DESCRIPTORS: DISCOMFORT;SORE;ITCHING

## 2025-04-24 ASSESSMENT — PAIN DESCRIPTION - ORIENTATION: ORIENTATION: LOWER

## 2025-04-24 NOTE — PROGRESS NOTES
Assumed care of pt for this shift.  Discussed plan of care for the shift as well as safe sleep practices.  Pt c/o pain 7/10 medicated per MAR no further questions at this time.  Rest encouraged. Call light within reach.

## 2025-04-24 NOTE — DISCHARGE INSTRUCTIONS
feeling worried that something bad may happen. Some anxiety during and after pregnancy is common. You may worry about things like your health or your baby's health. If worrying gets in the way of your daily life, treatment can help. Your doctor may recommend counseling and self-care. You may take medicines.  If you have any of these symptoms during or after pregnancy and they last longer than 2 weeks, you may have anxiety. Talk to your doctor.        Changes in your emotions, including:   Not being able to stop worrying.  Feeling nervous or on edge.  Not being able to concentrate.  Feeling irritable or restless.        Physical changes, including:   Trouble sleeping.  Extreme tiredness (fatigue).  Headaches or tense muscles.  Nausea or stomach pain.    Try to go to all of your counseling sessions.       Take your medicines exactly as your doctor tells you.       Eat a variety of healthy foods, and get some daily exercise.       Find ways to help you relax, such as deep breathing exercises.       Get as much rest as possible.       Avoid using alcohol, nicotine, and drugs, and talk to your doctor if you need help quitting.       Connect with other people, such as friends or a support group. You can also call the Maternal Mental Health Hotline at [facility] (1-863.698.6246) for support.     When should you call for help?   Call 872  anytime you think you may need emergency care. For example, call if:    You feel you can't stop from hurting yourself, your baby, or someone else.   Where to get help 24 hours a day, 7 days a week   If you or someone you know talks about suicide, self-harm, a mental health crisis, a substance use crisis, or any other kind of emotional distress, get help right away. You can:    Call the Suicide and Crisis Lifeline at 493.     Call 1-210-240-TALK (1-366.716.3152).     Text HOME to 842824 to access the Crisis Text Line.   Call your doctor now or seek immediate medical care if:    You are

## 2025-04-24 NOTE — PLAN OF CARE
Problem: Vaginal Birth or  Section  Goal: Fetal and maternal status remain reassuring during the birth process  Description:  Birth OB-Pregnancy care plan goal which identifies if the fetal and maternal status remain reassuring during the birth process  Outcome: Progressing     Problem: Postpartum  Goal: Experiences normal postpartum course  Description:  Postpartum OB-Pregnancy care plan goal which identifies if the mother is experiencing a normal postpartum course  Outcome: Progressing  Goal: Appropriate maternal -  bonding  Description:  Postpartum OB-Pregnancy care plan goal which identifies if the mother and  are bonding appropriately  Outcome: Progressing  Goal: Establishment of infant feeding pattern  Description:  Postpartum OB-Pregnancy care plan goal which identifies if the mother is establishing a feeding pattern with their   Outcome: Progressing  Goal: Incisions, wounds, or drain sites healing without S/S of infection  Outcome: Progressing     Problem: Pain  Goal: Verbalizes/displays adequate comfort level or baseline comfort level  Outcome: Progressing     Problem: Infection - Adult  Goal: Absence of infection at discharge  Outcome: Progressing     Problem: Safety - Adult  Goal: Free from fall injury  Outcome: Progressing     Problem: Discharge Planning  Goal: Discharge to home or other facility with appropriate resources  Outcome: Progressing

## 2025-04-24 NOTE — PLAN OF CARE
Problem: Vaginal Birth or  Section  Goal: Fetal and maternal status remain reassuring during the birth process  Description:  Birth OB-Pregnancy care plan goal which identifies if the fetal and maternal status remain reassuring during the birth process  2025 by Verenice Springer RN  Outcome: Progressing  2025 by Jake Frias RN  Outcome: Progressing     Problem: Postpartum  Goal: Experiences normal postpartum course  Description:  Postpartum OB-Pregnancy care plan goal which identifies if the mother is experiencing a normal postpartum course  2025 by Verenice Springer RN  Outcome: Progressing  2025 by Jake Frias RN  Outcome: Progressing  Goal: Appropriate maternal -  bonding  Description:  Postpartum OB-Pregnancy care plan goal which identifies if the mother and  are bonding appropriately  2025 by Verenice Springer RN  Outcome: Progressing  2025 by Jake Frias RN  Outcome: Progressing  Goal: Establishment of infant feeding pattern  Description:  Postpartum OB-Pregnancy care plan goal which identifies if the mother is establishing a feeding pattern with their   2025 by Verenice Springer RN  Outcome: Progressing  2025 by Jake Frias RN  Outcome: Progressing  Goal: Incisions, wounds, or drain sites healing without S/S of infection  2025 by Verenice Springer RN  Outcome: Progressing  2025 by Jake Frias RN  Outcome: Progressing     Problem: Pain  Goal: Verbalizes/displays adequate comfort level or baseline comfort level  2025 by Verenice Springer RN  Outcome: Progressing  2025 by Jake Frias RN  Outcome: Progressing     Problem: Infection - Adult  Goal: Absence of infection at discharge  2025 by Verenice Springer RN  Outcome: Progressing  2025 by Jake Frias RN  Outcome: Progressing  Goal: Absence of

## (undated) DEVICE — SOLUTION IV IRRIG POUR BRL 0.9% SODIUM CHL 2F7124

## (undated) DEVICE — DRAPE,EXTREMITY,89X128,STERILE: Brand: MEDLINE

## (undated) DEVICE — SWAB FBR TIP L16IN PLAS RAYON TIP FOR OB GYN PROCTOSCOPIC

## (undated) DEVICE — NEEDLE HYPO 25GA L1.5IN BLU POLYPR HUB S STL REG BVL STR

## (undated) DEVICE — INSTRUMENT SYSTEM 4 BATTERY REUSABLE

## (undated) DEVICE — GOWN,SIRUS,FABRNF,2XL,18/CS: Brand: MEDLINE

## (undated) DEVICE — COVER,LIGHT HANDLE,FLX,2/PK: Brand: MEDLINE INDUSTRIES, INC.

## (undated) DEVICE — PAD,NON-ADHERENT,2X3,STERILE,LF,1/PK: Brand: MEDLINE

## (undated) DEVICE — GLOVE ORANGE PI 7   MSG9070

## (undated) DEVICE — STAPLER SKIN SQ 30 ABSRB STPL DISP INSORB ORDER VIA PHONE OR EMAIL

## (undated) DEVICE — APPLICATOR MEDICATED 26 CC SOLUTION HI LT ORNG CHLORAPREP

## (undated) DEVICE — GLOVE SURG SZ 9 THK91MIL LTX FREE SYN POLYISOPRENE ANTI

## (undated) DEVICE — COTTON TIPPED APPLICATORS: Brand: DEROYAL

## (undated) DEVICE — GLOVE ORANGE PI 7 1/2   MSG9075

## (undated) DEVICE — ELECTRODE PT RET AD L9FT HI MOIST COND ADH HYDRGEL CORDED

## (undated) DEVICE — SYRINGE MED 10ML POLYPR LUERSLIP TIP FLAT TOP W/O SFTY DISP

## (undated) DEVICE — PENCIL ES CRD L10FT HND SWCHING ROCK SWCH W/ EDGE COAT BLDE

## (undated) DEVICE — COVER,MAYO STAND,STERILE: Brand: MEDLINE

## (undated) DEVICE — SCALPEL SURG NO21 S STL STR W/ INTEGR MTRC RUL DISP

## (undated) DEVICE — BLADE CLIPPER GEN PURP NS

## (undated) DEVICE — BASIC SINGLE BASIN 1-LF: Brand: MEDLINE INDUSTRIES, INC.

## (undated) DEVICE — DRESSING,GAUZE,XEROFORM,CURAD,1"X8",ST: Brand: CURAD

## (undated) DEVICE — PADDING CAST W6INXL4YD COT LO LINTING WYTEX

## (undated) DEVICE — ZIMMER® STERILE DISPOSABLE TOURNIQUET CUFF WITH PLC, DUAL PORT, SINGLE BLADDER, 34 IN. (86 CM)

## (undated) DEVICE — GLOVE SURG SZ 65 THK91MIL LTX FREE SYN POLYISOPRENE

## (undated) DEVICE — CHLORAPREP 26ML ORANGE

## (undated) DEVICE — VAGINAL PREP TRAY: Brand: MEDLINE INDUSTRIES, INC.

## (undated) DEVICE — GOWN,SIRUS,FABRNF,XL,20/CS: Brand: MEDLINE

## (undated) DEVICE — TOWEL,OR,DSP,ST,BLUE,STD,6/PK,12PK/CS: Brand: MEDLINE

## (undated) DEVICE — MASTISOL ADHESIVE LIQ 2/3ML

## (undated) DEVICE — SUTURE VICRYL SZ 1 L27IN ABSRB VLT L36MM CT-1 1/2 CIR J341H

## (undated) DEVICE — PAD,SANITARY,11 IN,MAXI,N-STRL,IND WRAP: Brand: MEDLINE

## (undated) DEVICE — NEEDLE FLTR 18GA L1.5IN MEM THK5UM BLNT DISP

## (undated) DEVICE — GLOVE SURG SZ 6 THK91MIL LTX FREE SYN POLYISOPRENE ANTI

## (undated) DEVICE — INTENDED FOR TISSUE SEPARATION, AND OTHER PROCEDURES THAT REQUIRE A SHARP SURGICAL BLADE TO PUNCTURE OR CUT.: Brand: BARD-PARKER ® STAINLESS STEEL BLADES

## (undated) DEVICE — 1000 S-DRAPE TOWEL DRAPE 10/BX: Brand: STERI-DRAPE™

## (undated) DEVICE — MARKER,SKIN,WI/RULER AND LABELS: Brand: MEDLINE

## (undated) DEVICE — PACK PROCEDURE SURG GEN CUST

## (undated) DEVICE — PADDING,UNDERCAST,COTTON, 4"X4YD STERILE: Brand: MEDLINE

## (undated) DEVICE — LEGGINGS, PAIR, 31X48, STERILE: Brand: MEDLINE

## (undated) DEVICE — REDUCER PRESSURE ANES 0.375 IN 1.25-0.375 IN PREFLTR

## (undated) DEVICE — GLOVE SURG SZ 85 L12IN FNGR THK13MIL WHT ISOLEX POLYISOPRENE

## (undated) DEVICE — GAUZE,SPONGE,4"X4",8PLY,STRL,LF,10/TRAY: Brand: MEDLINE

## (undated) DEVICE — SUTURE STRATAFIX SYMMETRIC SZ 1 L18IN ABSRB VLT CT1 L36CM SXPP1A404

## (undated) DEVICE — BANDAGE COMPR W6INXL12FT SMOOTH FOR LIMB EXSANG ESMARCH

## (undated) DEVICE — TUBING SUCT 12FR MAL ALUM SHFT FN CAP VENT UNIV CONN W/ OBT

## (undated) DEVICE — GARMENT,MEDLINE,DVT,INT,CALF,MED, GEN2: Brand: MEDLINE

## (undated) DEVICE — 3M™ STERI-STRIP™ ELASTIC SKIN CLOSURES, E4547, 1/2 IN X 4 IN (12 MM X 100 MM), 6 STRIPS/ENVELOPE: Brand: 3M™ STERI-STRIP™

## (undated) DEVICE — SUTURE MONOCRYL SZ 0 L27IN ABSRB VLT CT-1 L36MM 1/2 CIR TAPR Y340H

## (undated) DEVICE — STANDARD HYPODERMIC NEEDLE,POLYPROPYLENE HUB: Brand: MONOJECT

## (undated) DEVICE — 4-PORT MANIFOLD: Brand: NEPTUNE 2

## (undated) DEVICE — ELECTRODE ARTHSCP 15X11MM SHFT 11CM MPLR LOOP GRN DISP W/

## (undated) DEVICE — BIT DRL DIA2.5MM FOR ANK FRAC MGMT SYS

## (undated) DEVICE — BNDG,ELSTC,MATRIX,STRL,4"X5YD,LF,HOOK&LP: Brand: MEDLINE

## (undated) DEVICE — BNDG,ELSTC,MATRIX,STRL,6"X5YD,LF,HOOK&LP: Brand: MEDLINE

## (undated) DEVICE — EXTENDER ELECTRD L11CM SHFT STR

## (undated) DEVICE — RETRACTOR SURG M-L RNG DIA17CM INCIS 15CM ELAS SELF RET BLNT

## (undated) DEVICE — TUBING, SUCTION, 3/16" X 12', STRAIGHT: Brand: MEDLINE

## (undated) DEVICE — DRAPE C ARM W41XL74IN UNIV MOB W RUBBERBAND CLP

## (undated) DEVICE — FILTER EVACUATOR   0.3 MICRONS

## (undated) DEVICE — TUBING, SUCTION, 9/32" X 10', STRAIGHT: Brand: MEDLINE

## (undated) DEVICE — DRAPE,REIN 53X77,STERILE: Brand: MEDLINE

## (undated) DEVICE — BIT DRL DIA3MM FOR ANK FX MGMT

## (undated) DEVICE — NEEDLE SPNL L3.5IN PNK HUB S STL REG WALL FIT STYL W/ QNCKE

## (undated) DEVICE — CONTROL SYRINGE LUER-LOCK TIP: Brand: MONOJECT

## (undated) DEVICE — GAUZE,SPONGE,4"X4",16PLY,XRAY,STRL,LF: Brand: MEDLINE

## (undated) DEVICE — SCALPEL DISP STAINLESS STEEL STERILE #10

## (undated) DEVICE — STOCKINETTE,DOUBLE PLY,6X48,STERILE: Brand: MEDLINE

## (undated) DEVICE — DOUBLE BASIN SET: Brand: MEDLINE INDUSTRIES, INC.

## (undated) DEVICE — AMD-RITMED SOLON PURFYBR MACROPUR SWAB 6 INCH: Brand: AMD-RITMED SOLON PURFYBR

## (undated) DEVICE — FILTER SMK EVAC

## (undated) DEVICE — DRAPE,U/ SHT,SPLIT,PLAS,STERIL: Brand: MEDLINE

## (undated) DEVICE — COVER HNDL LT DISP

## (undated) DEVICE — BIT DRL DIA2MM CALIB FOR ANK FRAC MGMT SYS

## (undated) DEVICE — SPLINT ORTH W5XL30IN PLSTR OF PARIS FAST IMMOB OF FRAC HI

## (undated) DEVICE — CESAREAN BIRTH PACK: Brand: MEDLINE INDUSTRIES, INC.

## (undated) DEVICE — MEDICINE CUP, GRADUATED, STER: Brand: MEDLINE

## (undated) DEVICE — TRAY DRILL SYSTEM 4 REUSABLE

## (undated) DEVICE — DRESSING FOAM POST OPERATIVE 4X10 IN MEPILEX BORDER AG